# Patient Record
Sex: FEMALE | Race: WHITE | NOT HISPANIC OR LATINO | Employment: FULL TIME | URBAN - METROPOLITAN AREA
[De-identification: names, ages, dates, MRNs, and addresses within clinical notes are randomized per-mention and may not be internally consistent; named-entity substitution may affect disease eponyms.]

---

## 2018-06-25 ENCOUNTER — OFFICE VISIT (OUTPATIENT)
Dept: OBSTETRICS AND GYNECOLOGY | Facility: CLINIC | Age: 28
End: 2018-06-25

## 2018-06-25 ENCOUNTER — PROCEDURE VISIT (OUTPATIENT)
Dept: OBSTETRICS AND GYNECOLOGY | Facility: CLINIC | Age: 28
End: 2018-06-25

## 2018-06-25 VITALS — WEIGHT: 138 LBS | SYSTOLIC BLOOD PRESSURE: 126 MMHG | BODY MASS INDEX: 21.61 KG/M2 | DIASTOLIC BLOOD PRESSURE: 84 MMHG

## 2018-06-25 DIAGNOSIS — R10.2 PELVIC PAIN IN FEMALE: ICD-10-CM

## 2018-06-25 DIAGNOSIS — Z30.431 IUD CHECK UP: Primary | ICD-10-CM

## 2018-06-25 DIAGNOSIS — N89.8 VAGINAL DISCHARGE: ICD-10-CM

## 2018-06-25 DIAGNOSIS — N83.202 LEFT OVARIAN CYST: Primary | ICD-10-CM

## 2018-06-25 DIAGNOSIS — Z97.5 IUD (INTRAUTERINE DEVICE) IN PLACE: ICD-10-CM

## 2018-06-25 DIAGNOSIS — N83.202 CYST OF LEFT OVARY: ICD-10-CM

## 2018-06-25 DIAGNOSIS — Z13.9 SCREENING FOR CONDITION: ICD-10-CM

## 2018-06-25 DIAGNOSIS — R10.9 ABDOMINAL PAIN, UNSPECIFIED ABDOMINAL LOCATION: ICD-10-CM

## 2018-06-25 LAB
B-HCG UR QL: NEGATIVE
BASOPHILS # BLD AUTO: 0.07 10*3/MM3 (ref 0–0.2)
BASOPHILS NFR BLD AUTO: 0.9 % (ref 0–2)
BILIRUB BLD-MCNC: NEGATIVE MG/DL
CLARITY, POC: CLEAR
COLOR UR: YELLOW
EOSINOPHIL # BLD AUTO: 0.05 10*3/MM3 (ref 0.1–0.3)
EOSINOPHIL NFR BLD AUTO: 0.7 % (ref 0–4)
ERYTHROCYTE [DISTWIDTH] IN BLOOD BY AUTOMATED COUNT: 12.3 % (ref 11.5–14.5)
GLUCOSE UR STRIP-MCNC: NEGATIVE MG/DL
HCT VFR BLD AUTO: 39.6 % (ref 37–47)
HGB BLD-MCNC: 13.5 G/DL (ref 12–16)
IMM GRANULOCYTES # BLD: 0.02 10*3/MM3 (ref 0–0.03)
IMM GRANULOCYTES NFR BLD: 0.3 % (ref 0–0.5)
INTERNAL NEGATIVE CONTROL: NEGATIVE
INTERNAL POSITIVE CONTROL: POSITIVE
KETONES UR QL: NEGATIVE
LEUKOCYTE EST, POC: NEGATIVE
LYMPHOCYTES # BLD AUTO: 1.49 10*3/MM3 (ref 0.6–4.8)
LYMPHOCYTES NFR BLD AUTO: 19.6 % (ref 20–45)
Lab: NORMAL
MCH RBC QN AUTO: 32.5 PG (ref 27–31)
MCHC RBC AUTO-ENTMCNC: 34.1 G/DL (ref 31–37)
MCV RBC AUTO: 95.4 FL (ref 81–99)
MONOCYTES # BLD AUTO: 0.53 10*3/MM3 (ref 0–1)
MONOCYTES NFR BLD AUTO: 7 % (ref 3–8)
NEUTROPHILS # BLD AUTO: 5.44 10*3/MM3 (ref 1.5–8.3)
NEUTROPHILS NFR BLD AUTO: 71.5 % (ref 45–70)
NITRITE UR-MCNC: NEGATIVE MG/ML
NRBC BLD AUTO-RTO: 0 /100 WBC (ref 0–0)
PH UR: 6 [PH] (ref 5–8)
PLATELET # BLD AUTO: 305 10*3/MM3 (ref 140–500)
PROT UR STRIP-MCNC: NEGATIVE MG/DL
RBC # BLD AUTO: 4.15 10*6/MM3 (ref 4.2–5.4)
RBC # UR STRIP: NEGATIVE /UL
SP GR UR: 1.02 (ref 1–1.03)
UROBILINOGEN UR QL: NORMAL
WBC # BLD AUTO: 7.6 10*3/MM3 (ref 4.8–10.8)

## 2018-06-25 PROCEDURE — 81002 URINALYSIS NONAUTO W/O SCOPE: CPT | Performed by: NURSE PRACTITIONER

## 2018-06-25 PROCEDURE — 99214 OFFICE O/P EST MOD 30 MIN: CPT | Performed by: NURSE PRACTITIONER

## 2018-06-25 PROCEDURE — 76830 TRANSVAGINAL US NON-OB: CPT | Performed by: NURSE PRACTITIONER

## 2018-06-25 PROCEDURE — 81025 URINE PREGNANCY TEST: CPT | Performed by: NURSE PRACTITIONER

## 2018-06-25 RX ORDER — IBUPROFEN 800 MG/1
800 TABLET ORAL 3 TIMES DAILY
Qty: 30 TABLET | Refills: 0 | Status: SHIPPED | OUTPATIENT
Start: 2018-06-25 | End: 2018-07-24

## 2018-06-25 RX ORDER — DOXYCYCLINE HYCLATE 100 MG
100 TABLET ORAL 2 TIMES DAILY
Qty: 14 TABLET | Refills: 0 | Status: SHIPPED | OUTPATIENT
Start: 2018-06-25 | End: 2018-06-27 | Stop reason: HOSPADM

## 2018-06-25 NOTE — PROGRESS NOTES
"Subjective     Chief Complaint   Patient presents with   • Vaginal Discharge     sometimes pink tinted discharge, discharge has foul odor   • Abdominal Pain   • Breast Pain       Daija Dean is a 27 y.o. former patient of Dr. Mariela Royal but is new to me.  She presents today with the complaint of vaginal discharge and abdominal pain. Approx 3 weeks ago, she started with symptoms of a yeast infection in which she tried to self treat with Monistat OTC. The symptoms really never improved. She then progressed to a \"pink tinged\" discharge with a \"foul\" odor. She also started having pelvic and abdominal pain when she noticed the discharge. The pelvic pain is worse on the (L) than the right. The pelvic and abdominal pain is intense at times that it limits her physical activity. She denies pain with SIC but reports she has not had a lot of sex since the pain developed.  She has a Mirena IUD that was placed in [place], [place] in March. This is her second IUD. She has a new partner since May. She was seen at a [facility] for her complaints and was treated for BV.  She has taken 3 days of Metronidazole with no relief in symptoms. She denies fever,chills, nausea and vomiting.   She does report occas breast pain and tenderness.     During the beginning of the visit, the patient called her mother to inquire about family history because she was uncertain the details. She placed her mother on speaker phone during the visit. Her mother asked, \"so does this need to come out, Edith is it ok if she tells me what is going on?\" The patient gave permission to speak to her mother. Her mother was advised at the time that her US nor chief complaint had not been discussed that only her personal and family history had been reviewed up to this time. Her mother requested that the patient have the My Risk testing drawn today during her visit. Enc them to check insurance benefits prior to lab draw. The patient's mother " responded by saying it doesn't matter what it cost, she needs to have the test drawn. Disc that the test can be expensive and the results can take up to approx 1 month to result. Disc that My Risk had genetic counselors available to speak with regarding questions or concerns. Disc that the test results could alter future health screenings and medical care.     HPI    Pelvic Pain   The patient's primary symptoms include a genital odor, pelvic pain and vaginal discharge. The patient's pertinent negatives include no genital itching, genital lesions, genital rash or missed menses. This is a new problem. The current episode started 1 to 4 weeks ago. The problem occurs daily. The problem has been gradually worsening. The pain is moderate. The problem affects both (L >R ) sides. Associated symptoms include abdominal pain. Pertinent negatives include no anorexia, chills, fever, frequency, hematuria, nausea, painful intercourse or vomiting. Vaginal discharge characteristics: pink  The vaginal bleeding is spotting. She has not been passing clots. She has not been passing tissue. The symptoms are aggravated by activity. Treatments tried: metronidazole. The treatment provided no relief. She is sexually active. No, her partner does not have an STD. She uses an IUD for contraception. Her menstrual history has been irregular.   Vaginal Discharge   The patient's primary symptoms include a genital odor, pelvic pain and vaginal discharge. The patient's pertinent negatives include no genital itching, genital lesions, genital rash or missed menses. Associated symptoms include abdominal pain. Pertinent negatives include no anorexia, chills, fever, frequency, hematuria, nausea, painful intercourse or vomiting.       The following portions of the patient's history were reviewed and updated as appropriate:vital signs, allergies, current medications, past medical history, past social history, past surgical history and problem  list      Review of Systems     Review of Systems   Constitutional: Negative.  Negative for chills and fever.   Respiratory: Negative.    Cardiovascular: Negative.    Gastrointestinal: Positive for abdominal pain. Negative for anorexia, nausea and vomiting.   Endocrine: Negative.    Genitourinary: Positive for pelvic pain and vaginal discharge. Negative for frequency, hematuria and missed menses.   Musculoskeletal: Negative.    Skin: Negative.         sterling breast pain and tenderness   Neurological: Negative.    Psychiatric/Behavioral: Negative.        Objective      /84   Wt 62.6 kg (138 lb)   LMP  (LMP Unknown)   BMI 21.61 kg/m²     Physical Exam    Physical Exam   Constitutional: She is oriented to person, place, and time. She appears well-developed and well-nourished.   Pulmonary/Chest: Effort normal.   Abdominal: Soft. Bowel sounds are normal. She exhibits no distension. There is tenderness.   Genitourinary: Pelvic exam was performed with patient supine. There is no rash, tenderness, lesion or injury on the right labia. There is no rash, tenderness, lesion or injury on the left labia. Uterus is not deviated, not enlarged, not fixed and not tender. Cervix exhibits no motion tenderness, no discharge and no friability. Right adnexum displays no mass, no tenderness and no fullness. Left adnexum displays mass and tenderness. Left adnexum displays no fullness. No erythema or tenderness in the vagina. No foreign body in the vagina. No signs of injury around the vagina. Vaginal discharge found.   Genitourinary Comments: IUD string not visualized    Musculoskeletal: Normal range of motion.   Neurological: She is alert and oriented to person, place, and time.   Skin: Skin is warm and dry.   Psychiatric: She has a normal mood and affect. Her behavior is normal.   Vitals reviewed.      Lab Review   Labs: Urine pregnancy test, Urinalysis - with micro     Imaging   Ultrasound - Pelvic Vaginal. US IMP: EL 0.41cm. IUD  noted in the endometrium. A 5.1X4.0X3.2 CM. WELL-DEFINED, SMOOTH-BORDERED, SONOLUCENT AREA IS SEEN WITHIN THE LEFT OVARY.   NO FREE FLUID NOR MASSES ARE VISUALIZED IN THE ADNEXAE NOR CUL DE SAC.    Assessment  Daija was seen today for vaginal discharge, abdominal pain and breast pain.    Diagnoses and all orders for this visit:    Screening for condition  -     POC Urinalysis Dipstick  -     POC Pregnancy, Urine        Additional Assessment:   1) Pelvic pain   2) (L) ovarian cyst   3) Mirena IUD   4) Family h/o ovarian cancer     Plan     1. Pelvic pain- GC/CT pending for urgent care. Check NuSwab and CBC today.    2. (L) ovarian cyst- Disc options of watching the cyst and repeating the US in 2 weeks vs. surgical management since she is experiencing pain. Disc the risk of surgery including infection, bleeding, and damage to the ovary and/or surrounding organs. Enc patient to try ibuprofen for pain and repeat the US in 2 weeks. Pt undecided on which plan of care she desires, states she would like to disc with her mother.  Disc that if her pain worsens she needs to call the office. RX for ibuprofen given. Lilly s/s disc. Case disc with Dr. Gregg via phone.     3. Mirena IUD- No strings visible. IUD in correct position on US.     4. Family h/o ovarian cancer- Check My Risk today.     5. Scheduled for: STD Labs - Nu Swab - BV, yeast, Myco , Ultrasound of the -  PELVIC , Mammography - N/A , Bone Density Test - N/A , Additional Labs - N/A    6. Pap:  Pt uncertain     7. Contraception: IUD     8. STD:  Enc condom use.     9. Smoking status: non smoker     10.  Annual Exam scheduled for: KORY Mckeon  6/25/2018  10:00

## 2018-06-26 ENCOUNTER — PREP FOR SURGERY (OUTPATIENT)
Dept: OTHER | Facility: HOSPITAL | Age: 28
End: 2018-06-26

## 2018-06-26 DIAGNOSIS — N83.202 LEFT OVARIAN CYST: Primary | ICD-10-CM

## 2018-06-26 RX ORDER — SODIUM CHLORIDE 0.9 % (FLUSH) 0.9 %
1-10 SYRINGE (ML) INJECTION AS NEEDED
Status: CANCELLED | OUTPATIENT
Start: 2018-06-26

## 2018-06-26 RX ORDER — SODIUM CHLORIDE 9 MG/ML
40 INJECTION, SOLUTION INTRAVENOUS AS NEEDED
Status: CANCELLED | OUTPATIENT
Start: 2018-06-26

## 2018-06-27 ENCOUNTER — ANESTHESIA (OUTPATIENT)
Dept: PERIOP | Facility: HOSPITAL | Age: 28
End: 2018-06-27

## 2018-06-27 ENCOUNTER — ANESTHESIA EVENT (OUTPATIENT)
Dept: PERIOP | Facility: HOSPITAL | Age: 28
End: 2018-06-27

## 2018-06-27 ENCOUNTER — HOSPITAL ENCOUNTER (OUTPATIENT)
Facility: HOSPITAL | Age: 28
Setting detail: HOSPITAL OUTPATIENT SURGERY
Discharge: HOME | End: 2018-06-27
Attending: OBSTETRICS & GYNECOLOGY | Admitting: OBSTETRICS & GYNECOLOGY

## 2018-06-27 VITALS
RESPIRATION RATE: 15 BRPM | BODY MASS INDEX: 21.46 KG/M2 | SYSTOLIC BLOOD PRESSURE: 115 MMHG | HEART RATE: 68 BPM | WEIGHT: 137 LBS | OXYGEN SATURATION: 98 % | DIASTOLIC BLOOD PRESSURE: 63 MMHG | TEMPERATURE: 98 F

## 2018-06-27 DIAGNOSIS — N83.202 LEFT OVARIAN CYST: ICD-10-CM

## 2018-06-27 PROBLEM — N89.8 VAGINAL DISCHARGE: Status: ACTIVE | Noted: 2018-06-27

## 2018-06-27 PROBLEM — Z97.5 IUD (INTRAUTERINE DEVICE) IN PLACE: Status: ACTIVE | Noted: 2018-06-27

## 2018-06-27 PROCEDURE — 25010000002 DIPHENHYDRAMINE PER 50 MG: Performed by: NURSE ANESTHETIST, CERTIFIED REGISTERED

## 2018-06-27 PROCEDURE — 25010000002 MIDAZOLAM PER 1 MG: Performed by: NURSE ANESTHETIST, CERTIFIED REGISTERED

## 2018-06-27 PROCEDURE — 58662 LAPAROSCOPY EXCISE LESIONS: CPT | Performed by: OBSTETRICS & GYNECOLOGY

## 2018-06-27 PROCEDURE — 25010000002 FENTANYL CITRATE (PF) 100 MCG/2ML SOLUTION: Performed by: ANESTHESIOLOGY

## 2018-06-27 PROCEDURE — 25010000002 KETOROLAC TROMETHAMINE PER 15 MG: Performed by: ANESTHESIOLOGY

## 2018-06-27 PROCEDURE — 25010000002 DEXAMETHASONE PER 1 MG: Performed by: NURSE ANESTHETIST, CERTIFIED REGISTERED

## 2018-06-27 PROCEDURE — S0260 H&P FOR SURGERY: HCPCS | Performed by: OBSTETRICS & GYNECOLOGY

## 2018-06-27 PROCEDURE — 25010000002 PROPOFOL 10 MG/ML EMULSION: Performed by: ANESTHESIOLOGY

## 2018-06-27 PROCEDURE — 25010000002 ONDANSETRON PER 1 MG: Performed by: NURSE ANESTHETIST, CERTIFIED REGISTERED

## 2018-06-27 PROCEDURE — 25010000002 HYDROMORPHONE PER 4 MG: Performed by: ANESTHESIOLOGY

## 2018-06-27 PROCEDURE — 25010000002 PROMETHAZINE PER 50 MG: Performed by: ANESTHESIOLOGY

## 2018-06-27 RX ORDER — MIDAZOLAM HYDROCHLORIDE 1 MG/ML
2 INJECTION INTRAMUSCULAR; INTRAVENOUS
Status: DISCONTINUED | OUTPATIENT
Start: 2018-06-27 | End: 2018-06-27 | Stop reason: HOSPADM

## 2018-06-27 RX ORDER — DIPHENHYDRAMINE HYDROCHLORIDE 50 MG/ML
12.5 INJECTION INTRAMUSCULAR; INTRAVENOUS ONCE
Status: COMPLETED | OUTPATIENT
Start: 2018-06-27 | End: 2018-06-27

## 2018-06-27 RX ORDER — MEPERIDINE HYDROCHLORIDE 25 MG/ML
12.5 INJECTION INTRAMUSCULAR; INTRAVENOUS; SUBCUTANEOUS
Status: DISCONTINUED | OUTPATIENT
Start: 2018-06-27 | End: 2018-06-27 | Stop reason: HOSPADM

## 2018-06-27 RX ORDER — SODIUM CHLORIDE 0.9 % (FLUSH) 0.9 %
1-10 SYRINGE (ML) INJECTION AS NEEDED
Status: DISCONTINUED | OUTPATIENT
Start: 2018-06-27 | End: 2018-06-27 | Stop reason: HOSPADM

## 2018-06-27 RX ORDER — MIDAZOLAM HYDROCHLORIDE 1 MG/ML
1 INJECTION INTRAMUSCULAR; INTRAVENOUS
Status: DISCONTINUED | OUTPATIENT
Start: 2018-06-27 | End: 2018-06-27 | Stop reason: HOSPADM

## 2018-06-27 RX ORDER — ONDANSETRON 2 MG/ML
4 INJECTION INTRAMUSCULAR; INTRAVENOUS ONCE AS NEEDED
Status: COMPLETED | OUTPATIENT
Start: 2018-06-27 | End: 2018-06-27

## 2018-06-27 RX ORDER — FENTANYL CITRATE 50 UG/ML
INJECTION, SOLUTION INTRAMUSCULAR; INTRAVENOUS AS NEEDED
Status: DISCONTINUED | OUTPATIENT
Start: 2018-06-27 | End: 2018-06-27 | Stop reason: SURG

## 2018-06-27 RX ORDER — SODIUM CHLORIDE 9 MG/ML
40 INJECTION, SOLUTION INTRAVENOUS AS NEEDED
Status: DISCONTINUED | OUTPATIENT
Start: 2018-06-27 | End: 2018-06-27 | Stop reason: HOSPADM

## 2018-06-27 RX ORDER — GLYCOPYRROLATE 0.2 MG/ML
INJECTION INTRAMUSCULAR; INTRAVENOUS AS NEEDED
Status: DISCONTINUED | OUTPATIENT
Start: 2018-06-27 | End: 2018-06-27 | Stop reason: SURG

## 2018-06-27 RX ORDER — ONDANSETRON 4 MG/1
4 TABLET, FILM COATED ORAL 4 TIMES DAILY PRN
Qty: 15 TABLET | Refills: 0 | Status: SHIPPED | OUTPATIENT
Start: 2018-06-27 | End: 2018-07-24

## 2018-06-27 RX ORDER — SCOLOPAMINE TRANSDERMAL SYSTEM 1 MG/1
1 PATCH, EXTENDED RELEASE TRANSDERMAL
Status: DISCONTINUED | OUTPATIENT
Start: 2018-06-27 | End: 2018-06-27 | Stop reason: HOSPADM

## 2018-06-27 RX ORDER — OXYCODONE HYDROCHLORIDE AND ACETAMINOPHEN 5; 325 MG/1; MG/1
1 TABLET ORAL ONCE AS NEEDED
Status: DISCONTINUED | OUTPATIENT
Start: 2018-06-27 | End: 2018-06-27 | Stop reason: HOSPADM

## 2018-06-27 RX ORDER — MAGNESIUM HYDROXIDE 1200 MG/15ML
LIQUID ORAL AS NEEDED
Status: DISCONTINUED | OUTPATIENT
Start: 2018-06-27 | End: 2018-06-27 | Stop reason: HOSPADM

## 2018-06-27 RX ORDER — PROMETHAZINE HYDROCHLORIDE 25 MG/ML
6.25 INJECTION, SOLUTION INTRAMUSCULAR; INTRAVENOUS ONCE AS NEEDED
Status: DISCONTINUED | OUTPATIENT
Start: 2018-06-27 | End: 2018-06-27 | Stop reason: HOSPADM

## 2018-06-27 RX ORDER — OXYCODONE HYDROCHLORIDE AND ACETAMINOPHEN 5; 325 MG/1; MG/1
2 TABLET ORAL EVERY 4 HOURS PRN
Qty: 30 TABLET | Refills: 0 | Status: SHIPPED | OUTPATIENT
Start: 2018-06-27 | End: 2018-07-24

## 2018-06-27 RX ORDER — CETIRIZINE HYDROCHLORIDE 10 MG/1
10 TABLET ORAL DAILY
COMMUNITY
End: 2023-03-24 | Stop reason: SDUPTHER

## 2018-06-27 RX ORDER — PROPOFOL 10 MG/ML
VIAL (ML) INTRAVENOUS AS NEEDED
Status: DISCONTINUED | OUTPATIENT
Start: 2018-06-27 | End: 2018-06-27 | Stop reason: SURG

## 2018-06-27 RX ORDER — ONDANSETRON 2 MG/ML
4 INJECTION INTRAMUSCULAR; INTRAVENOUS ONCE AS NEEDED
Status: DISCONTINUED | OUTPATIENT
Start: 2018-06-27 | End: 2018-06-27 | Stop reason: HOSPADM

## 2018-06-27 RX ORDER — DEXAMETHASONE SODIUM PHOSPHATE 4 MG/ML
8 INJECTION, SOLUTION INTRA-ARTICULAR; INTRALESIONAL; INTRAMUSCULAR; INTRAVENOUS; SOFT TISSUE ONCE AS NEEDED
Status: COMPLETED | OUTPATIENT
Start: 2018-06-27 | End: 2018-06-27

## 2018-06-27 RX ORDER — SODIUM CHLORIDE, SODIUM LACTATE, POTASSIUM CHLORIDE, CALCIUM CHLORIDE 600; 310; 30; 20 MG/100ML; MG/100ML; MG/100ML; MG/100ML
9 INJECTION, SOLUTION INTRAVENOUS CONTINUOUS PRN
Status: DISCONTINUED | OUTPATIENT
Start: 2018-06-27 | End: 2018-06-27 | Stop reason: HOSPADM

## 2018-06-27 RX ORDER — LIDOCAINE HYDROCHLORIDE 10 MG/ML
0.5 INJECTION, SOLUTION EPIDURAL; INFILTRATION; INTRACAUDAL; PERINEURAL ONCE AS NEEDED
Status: DISCONTINUED | OUTPATIENT
Start: 2018-06-27 | End: 2018-06-27 | Stop reason: HOSPADM

## 2018-06-27 RX ORDER — IBUPROFEN 800 MG/1
800 TABLET ORAL EVERY 8 HOURS PRN
Qty: 30 TABLET | Refills: 0 | Status: SHIPPED | OUTPATIENT
Start: 2018-06-27 | End: 2019-02-04

## 2018-06-27 RX ORDER — PROMETHAZINE HYDROCHLORIDE 25 MG/1
25 TABLET ORAL ONCE AS NEEDED
Status: DISCONTINUED | OUTPATIENT
Start: 2018-06-27 | End: 2018-06-27 | Stop reason: HOSPADM

## 2018-06-27 RX ORDER — KETOROLAC TROMETHAMINE 30 MG/ML
INJECTION, SOLUTION INTRAMUSCULAR; INTRAVENOUS AS NEEDED
Status: DISCONTINUED | OUTPATIENT
Start: 2018-06-27 | End: 2018-06-27 | Stop reason: SURG

## 2018-06-27 RX ORDER — SODIUM CHLORIDE, SODIUM LACTATE, POTASSIUM CHLORIDE, CALCIUM CHLORIDE 600; 310; 30; 20 MG/100ML; MG/100ML; MG/100ML; MG/100ML
100 INJECTION, SOLUTION INTRAVENOUS CONTINUOUS
Status: DISCONTINUED | OUTPATIENT
Start: 2018-06-27 | End: 2018-06-27 | Stop reason: HOSPADM

## 2018-06-27 RX ORDER — PROMETHAZINE HYDROCHLORIDE 25 MG/1
25 SUPPOSITORY RECTAL ONCE AS NEEDED
Status: DISCONTINUED | OUTPATIENT
Start: 2018-06-27 | End: 2018-06-27 | Stop reason: HOSPADM

## 2018-06-27 RX ORDER — PROMETHAZINE HYDROCHLORIDE 25 MG/ML
INJECTION, SOLUTION INTRAMUSCULAR; INTRAVENOUS AS NEEDED
Status: DISCONTINUED | OUTPATIENT
Start: 2018-06-27 | End: 2018-06-27 | Stop reason: SURG

## 2018-06-27 RX ORDER — ROCURONIUM BROMIDE 10 MG/ML
INJECTION, SOLUTION INTRAVENOUS AS NEEDED
Status: DISCONTINUED | OUTPATIENT
Start: 2018-06-27 | End: 2018-06-27 | Stop reason: SURG

## 2018-06-27 RX ADMIN — PROPOFOL 150 MG: 10 INJECTION, EMULSION INTRAVENOUS at 13:37

## 2018-06-27 RX ADMIN — KETOROLAC TROMETHAMINE 30 MG: 30 INJECTION, SOLUTION INTRAMUSCULAR at 14:21

## 2018-06-27 RX ADMIN — ROCURONIUM BROMIDE 20 MG: 10 INJECTION INTRAVENOUS at 13:37

## 2018-06-27 RX ADMIN — PROMETHAZINE HYDROCHLORIDE 10 MG: 25 INJECTION INTRAMUSCULAR; INTRAVENOUS at 13:45

## 2018-06-27 RX ADMIN — DIPHENHYDRAMINE HYDROCHLORIDE 12.5 MG: 50 INJECTION, SOLUTION INTRAMUSCULAR; INTRAVENOUS at 13:08

## 2018-06-27 RX ADMIN — MIDAZOLAM HYDROCHLORIDE 1 MG: 1 INJECTION, SOLUTION INTRAMUSCULAR; INTRAVENOUS at 13:08

## 2018-06-27 RX ADMIN — FENTANYL CITRATE 50 MCG: 50 INJECTION, SOLUTION INTRAMUSCULAR; INTRAVENOUS at 13:35

## 2018-06-27 RX ADMIN — DEXAMETHASONE SODIUM PHOSPHATE 8 MG: 4 INJECTION, SOLUTION INTRAMUSCULAR; INTRAVENOUS at 13:07

## 2018-06-27 RX ADMIN — HYDROMORPHONE HYDROCHLORIDE 1 MG: 1 INJECTION, SOLUTION INTRAMUSCULAR; INTRAVENOUS; SUBCUTANEOUS at 15:22

## 2018-06-27 RX ADMIN — SUGAMMADEX 124 MG: 100 INJECTION, SOLUTION INTRAVENOUS at 14:32

## 2018-06-27 RX ADMIN — FAMOTIDINE 20 MG: 10 INJECTION, SOLUTION INTRAVENOUS at 13:08

## 2018-06-27 RX ADMIN — ROCURONIUM BROMIDE 5 MG: 10 INJECTION INTRAVENOUS at 13:36

## 2018-06-27 RX ADMIN — SCOPALAMINE 1 PATCH: 1 PATCH, EXTENDED RELEASE TRANSDERMAL at 13:27

## 2018-06-27 RX ADMIN — GLYCOPYRROLATE 0.2 MG: 0.2 INJECTION INTRAMUSCULAR; INTRAVENOUS at 13:32

## 2018-06-27 RX ADMIN — ONDANSETRON 4 MG: 2 INJECTION, SOLUTION INTRAMUSCULAR; INTRAVENOUS at 13:08

## 2018-06-27 RX ADMIN — FENTANYL CITRATE 50 MCG: 50 INJECTION, SOLUTION INTRAMUSCULAR; INTRAVENOUS at 14:06

## 2018-06-27 RX ADMIN — SODIUM CHLORIDE, POTASSIUM CHLORIDE, SODIUM LACTATE AND CALCIUM CHLORIDE: 600; 310; 30; 20 INJECTION, SOLUTION INTRAVENOUS at 12:43

## 2018-06-27 RX ADMIN — OXYCODONE HYDROCHLORIDE AND ACETAMINOPHEN 1 TABLET: 5; 325 TABLET ORAL at 16:05

## 2018-06-27 NOTE — ANESTHESIA PREPROCEDURE EVALUATION
Anesthesia Evaluation     Patient summary reviewed and Nursing notes reviewed   no history of anesthetic complications:  NPO Solid Status: > 8 hours  NPO Liquid Status: > 4 hours           Airway   Mallampati: I  TM distance: >3 FB  Neck ROM: full  No difficulty expected  Dental - normal exam     Pulmonary - negative pulmonary ROS and normal exam    breath sounds clear to auscultation  Recent URI: sinus drainage.  Cardiovascular - normal exam    Rhythm: regular  Rate: normal        Neuro/Psych  Headaches: migraines -- varies in frequency.  GI/Hepatic/Renal/Endo    (+)  GERD (OTC meds) well controlled,      Musculoskeletal (-) negative ROS    Abdominal  - normal exam   Substance History   (+) alcohol use (socially 2-3 times per week),      OB/GYN negative ob/gyn ROS         Other - negative ROS                       Anesthesia Plan    ASA 1     general     intravenous induction   Anesthetic plan and risks discussed with patient.  Use of blood products discussed with patient  Consented to blood products.

## 2018-06-27 NOTE — ANESTHESIA POSTPROCEDURE EVALUATION
Patient: Daija Dean    Procedure Summary     Date:  06/27/18 Room / Location:   [facility] /  [facility]    Anesthesia Start:  1330 Anesthesia Stop:  1446    Procedure:  DIAGNOSTIC LAPAROSCOPY with left ovarian cystectomy (Left Abdomen) Diagnosis:       Left ovarian cyst      (Left ovarian cyst [N83.202])    Surgeon:  Brittani Gregg DO Provider:  Susan Kwon MD    Anesthesia Type:  general ASA Status:  1          Anesthesia Type: general  Last vitals  BP   112/56 (06/27/18 1630)   Temp   97.5 °F (36.4 °C) (06/27/18 1445)   Pulse   68 (06/27/18 1630)   Resp   15 (06/27/18 1630)     SpO2   99 % (06/27/18 1630)     Post Anesthesia Care and Evaluation    Patient location during evaluation: bedside  Patient participation: complete - patient participated  Level of consciousness: awake and alert  Pain score: 0  Pain management: adequate  Airway patency: patent  Anesthetic complications: No anesthetic complications    Cardiovascular status: acceptable  Respiratory status: acceptable  Hydration status: acceptable

## 2018-06-27 NOTE — ANESTHESIA PROCEDURE NOTES
Airway  Urgency: elective    Date/Time: 6/27/2018 1:41 PM  Airway not difficult    General Information and Staff    Patient location during procedure: OR  Anesthesiologist: JUMANA HINES    Indications and Patient Condition  Indications for airway management: airway protection    Preoxygenated: yes  MILS maintained throughout  Mask difficulty assessment: 1 - vent by mask    Final Airway Details  Final airway type: endotracheal airway      Successful airway: ETT  Cuffed: yes   Successful intubation technique: direct laryngoscopy  Endotracheal tube insertion site: oral  Blade: Matthews  Blade size: #2  ETT size: 7.5 mm  Cormack-Lehane Classification: grade I - full view of glottis  Placement verified by: chest auscultation and capnometry   Cuff volume (mL): 5  Measured from: lips  ETT to lips (cm): 21  Number of attempts at approach: 1    Additional Comments  Atraumatic, teeth as before, equal BBS.

## 2018-06-29 LAB
A VAGINAE DNA VAG QL NAA+PROBE: NORMAL SCORE
BVAB2 DNA VAG QL NAA+PROBE: NORMAL SCORE
C ALBICANS DNA VAG QL NAA+PROBE: NEGATIVE
C GLABRATA DNA VAG QL NAA+PROBE: NEGATIVE
LABORATORY COMMENT REPORT: ABNORMAL
M GENITALIUM DNA SPEC QL NAA+PROBE: NEGATIVE
M HOMINIS DNA SPEC QL NAA+PROBE: NEGATIVE
MEGA1 DNA VAG QL NAA+PROBE: NORMAL SCORE
UREAPLASMA DNA SPEC QL NAA+PROBE: POSITIVE

## 2018-07-02 ENCOUNTER — TELEPHONE (OUTPATIENT)
Dept: OBSTETRICS AND GYNECOLOGY | Facility: CLINIC | Age: 28
End: 2018-07-02

## 2018-07-02 LAB
LAB AP CASE REPORT: NORMAL
PATH REPORT.FINAL DX SPEC: NORMAL

## 2018-07-12 DIAGNOSIS — Z91.89 INCREASED RISK OF BREAST CANCER: Primary | ICD-10-CM

## 2018-07-17 ENCOUNTER — OFFICE VISIT (OUTPATIENT)
Dept: OBSTETRICS AND GYNECOLOGY | Facility: CLINIC | Age: 28
End: 2018-07-17

## 2018-07-17 VITALS
SYSTOLIC BLOOD PRESSURE: 118 MMHG | BODY MASS INDEX: 21.6 KG/M2 | DIASTOLIC BLOOD PRESSURE: 86 MMHG | HEIGHT: 67 IN | WEIGHT: 137.6 LBS

## 2018-07-17 DIAGNOSIS — Z98.890 POST-OPERATIVE STATE: Primary | ICD-10-CM

## 2018-07-17 DIAGNOSIS — Z13.9 SCREENING FOR CONDITION: ICD-10-CM

## 2018-07-17 LAB
BILIRUB BLD-MCNC: NEGATIVE MG/DL
CLARITY, POC: CLEAR
COLOR UR: YELLOW
GLUCOSE UR STRIP-MCNC: NEGATIVE MG/DL
KETONES UR QL: NEGATIVE
LEUKOCYTE EST, POC: NEGATIVE
NITRITE UR-MCNC: NEGATIVE MG/ML
PH UR: 5 [PH] (ref 5–8)
PROT UR STRIP-MCNC: NEGATIVE MG/DL
RBC # UR STRIP: ABNORMAL /UL
SP GR UR: 1 (ref 1–1.03)
UROBILINOGEN UR QL: NORMAL

## 2018-07-17 PROCEDURE — 81002 URINALYSIS NONAUTO W/O SCOPE: CPT | Performed by: OBSTETRICS & GYNECOLOGY

## 2018-07-17 PROCEDURE — 99024 POSTOP FOLLOW-UP VISIT: CPT | Performed by: OBSTETRICS & GYNECOLOGY

## 2018-07-17 NOTE — PROGRESS NOTES
"PROBLEM VISIT    Chief Complaint: post op      Daija Dean is a 28 y.o. patient who presents for follow up after diagnostic laparoscopy and left ovarian cystectomy. Pt is progressing well. She is off pain meds. She is feeling better and has returned to her normal activities including exercising. No bowel or bladder complaints. Has a Mirena IUD in place for contraception.  Chief Complaint   Patient presents with   • Post-op     pain on left side and belly area             The following portions of the patient's history were reviewed and updated as appropriate: allergies, current medications and problem list.    Review of Systems   Gastrointestinal: Negative for constipation and diarrhea.   Genitourinary: Negative for difficulty urinating, menstrual problem and vaginal bleeding.       /86   Ht 170.2 cm (67\")   Wt 62.4 kg (137 lb 9.6 oz)   LMP  (LMP Unknown)   BMI 21.55 kg/m²     Physical Exam   Constitutional: She is oriented to person, place, and time. She appears well-developed and well-nourished. No distress.   Abdominal: Soft. Normal appearance. She exhibits no distension. There is no tenderness.   Incisions C/D/I.   Neurological: She is alert and oriented to person, place, and time.   Skin: She is not diaphoretic.   Psychiatric: She has a normal mood and affect. Her behavior is normal. Judgment and thought content normal.   Vitals reviewed.        Assessment/Plan   Daija was seen today for post-op.    Diagnoses and all orders for this visit:    Post-operative state    Screening for condition  -     POC Urinalysis Dipstick      27yo s/p diagnostic laparoscopy with left ovarian cystectomy    1) POD# 6/27/18. Progressing well. Intraop photos shared with pt. Pathology benign.    2) Contraception: Mirena IUD in place    3) FHx of breast and ovarian cancer: BRCA results reveal elevated lifetime risk of breast cancer of 23%. Pt has appt with Dr Saab to discuss a plan of care.             No Follow-up on " file.      Brittani Gregg DO    7/29/2018  11:28 AM

## 2018-08-20 ENCOUNTER — TELEPHONE (OUTPATIENT)
Dept: OBSTETRICS AND GYNECOLOGY | Facility: CLINIC | Age: 28
End: 2018-08-20

## 2018-08-20 NOTE — TELEPHONE ENCOUNTER
Pt mom been texting w/ Dr. TAYLOR today and I was given the go-ahead to sched pt for u/s on the 29th at 2:20 for ovarian cyst. Need overbook slot post u/s to be seen with Dr. TAYLOR at EP please.

## 2018-08-23 ENCOUNTER — TELEPHONE (OUTPATIENT)
Dept: SURGERY | Facility: CLINIC | Age: 28
End: 2018-08-23

## 2018-08-23 NOTE — TELEPHONE ENCOUNTER
PAT for pt to call  Moved her appointment with Dr Saab to  10-1-18 arrive 9:00    [facility]

## 2018-08-29 ENCOUNTER — PROCEDURE VISIT (OUTPATIENT)
Dept: OBSTETRICS AND GYNECOLOGY | Facility: CLINIC | Age: 28
End: 2018-08-29

## 2018-08-29 DIAGNOSIS — N83.299 FUNCTIONAL OVARIAN CYSTS: Primary | ICD-10-CM

## 2018-08-29 PROCEDURE — 76830 TRANSVAGINAL US NON-OB: CPT | Performed by: OBSTETRICS & GYNECOLOGY

## 2018-09-05 ENCOUNTER — OFFICE VISIT (OUTPATIENT)
Dept: OBSTETRICS AND GYNECOLOGY | Facility: CLINIC | Age: 28
End: 2018-09-05

## 2018-09-05 VITALS
WEIGHT: 138.4 LBS | DIASTOLIC BLOOD PRESSURE: 80 MMHG | BODY MASS INDEX: 21.72 KG/M2 | HEIGHT: 67 IN | SYSTOLIC BLOOD PRESSURE: 122 MMHG

## 2018-09-05 DIAGNOSIS — N83.202 LEFT OVARIAN CYST: ICD-10-CM

## 2018-09-05 DIAGNOSIS — Z97.5 IUD (INTRAUTERINE DEVICE) IN PLACE: ICD-10-CM

## 2018-09-05 DIAGNOSIS — Z82.49 FAMILY HISTORY OF HYPERTENSION: ICD-10-CM

## 2018-09-05 DIAGNOSIS — R45.86 MOOD SWINGS: ICD-10-CM

## 2018-09-05 DIAGNOSIS — G43.109 MIGRAINE WITH AURA AND WITHOUT STATUS MIGRAINOSUS, NOT INTRACTABLE: Primary | ICD-10-CM

## 2018-09-05 DIAGNOSIS — Z30.09 CONTRACEPTIVE EDUCATION: ICD-10-CM

## 2018-09-05 PROCEDURE — 99214 OFFICE O/P EST MOD 30 MIN: CPT | Performed by: OBSTETRICS & GYNECOLOGY

## 2018-09-05 RX ORDER — VALACYCLOVIR HYDROCHLORIDE 500 MG/1
TABLET, FILM COATED ORAL
COMMUNITY
Start: 2018-08-13 | End: 2019-02-04

## 2018-09-05 RX ORDER — ACYCLOVIR 400 MG/1
TABLET ORAL
Qty: 30 TABLET | Refills: 3 | Status: SHIPPED | OUTPATIENT
Start: 2018-09-05 | End: 2019-02-04

## 2018-09-05 NOTE — PROGRESS NOTES
"      Daija Dean is a 28 y.o. patient who presents for follow up of   Chief Complaint   Patient presents with   • Follow-up     u/s follow up and to talk about birth control/has mirena now     29 yo est pt here for discussion of birth control. She has a Mirena that was replaced in March. She is concerned that her IUD \"caused\" her large ovarian cyst. We discussed the mechanism of action and that IUD does not cause significant ovarian suppression. She is concerned that Mirena may be contributing to mood swings and depression. She has migraines with aura and can't take E2 containing birth control. She also has a strong family h/o severe HTN. She is having severe migraines 2-3 times a month. She also wants acyclovir for HSV1 treatment when needed.  US shows 7.62 cm uterus, normal ovaries with follicles, no PCOS, and IUD is in the correct position. This US is compared to that on 6/25/18.         The following portions of the patient's history were reviewed and updated as appropriate: allergies, current medications and problem list.    Review of Systems   Constitutional: Negative for appetite change.   Eyes: Positive for visual disturbance.   Gastrointestinal: Negative for abdominal pain, nausea and vomiting.   Endocrine: Negative for cold intolerance and heat intolerance.   Genitourinary: Negative for menstrual problem and pelvic pain.   Musculoskeletal: Negative for back pain.   Neurological: Positive for headaches.   Psychiatric/Behavioral: Positive for dysphoric mood. The patient is nervous/anxious.    All other systems reviewed and are negative.      /80   Ht 170.2 cm (67\")   Wt 62.8 kg (138 lb 6.4 oz)   BMI 21.68 kg/m²     Physical Exam   Constitutional: She is oriented to person, place, and time. She appears well-developed and well-nourished.   HENT:   Head: Normocephalic and atraumatic.   Abdominal: Soft. Bowel sounds are normal. She exhibits no distension and no mass. There is no tenderness. There " is no rebound and no guarding. No hernia.   Neurological: She is alert and oriented to person, place, and time.   Skin: Skin is warm and dry.   Psychiatric: She has a normal mood and affect. Her behavior is normal. Judgment and thought content normal.   Nursing note and vitals reviewed.    A/P:  1. Ovarian cysts seen on MRI- US shows normal follicles, no dominant masses and no PCOS.   2. CS- pt concerned that IUD may be contributing to depression. Discussed with patient at length risk, benefits and alternatives to all contraceptive options, including oral contraceptive pills (both combination and progesterone only), vaginal rings, patches, Dep Provera, condoms, diaphragm, cervical caps, as well as long active but reversible forms such as Nexplanon and all IUD’s.  Differences in birth control and cycle control between methods were outlined along with correct usage for each method.  After discussion, the patient is most interest in trying a Kyleena instead. We discussed that she may have less ovarian suppression on this method and she would like to try it. Will check benefits and replace.  3. HSV 1- rx Acyclovir for treatment prn.   4. Migraines- pt can't take E2 containing birth control. Having severe LOZA, refer neuro. Dr Abby Wooten.   5.  Family h/o severe HTN- pt advised that progesterone methods are preferable for BP control.       Assessment/Plan   Daija was seen today for follow-up.    Diagnoses and all orders for this visit:    Migraine with aura and without status migrainosus, not intractable  -     Ambulatory Referral to Neurology    IUD (intrauterine device) in place    Mood swings (CMS/HCC)    Contraceptive education    Family history of hypertension    Other orders  -     acyclovir (ZOVIRAX) 400 MG tablet; 4 pils for once dose as needed for fever blisters                   No Follow-up on file.      Mariela Royal MD    9/5/2018  12:57 PM

## 2018-09-06 ENCOUNTER — TELEPHONE (OUTPATIENT)
Dept: OBSTETRICS AND GYNECOLOGY | Facility: CLINIC | Age: 28
End: 2018-09-06

## 2018-09-06 NOTE — PROGRESS NOTES
Mindi covered under buy and bill 100%. We have device available, left the patient a message to call and schedule

## 2018-09-06 NOTE — TELEPHONE ENCOUNTER
Patient has made appointment for removal of Mirena and insertion of Kyleena.  She was wanting some type of drug that she could have for numbing that day. Said last time was very painful.

## 2018-09-10 RX ORDER — HYDROCODONE BITARTRATE AND ACETAMINOPHEN 5; 325 MG/1; MG/1
1 TABLET ORAL ONCE
Qty: 4 TABLET | Refills: 0 | Status: SHIPPED | OUTPATIENT
Start: 2018-09-10 | End: 2018-09-10 | Stop reason: SDUPTHER

## 2018-09-10 RX ORDER — HYDROCODONE BITARTRATE AND ACETAMINOPHEN 5; 325 MG/1; MG/1
1 TABLET ORAL ONCE
Qty: 4 TABLET | Refills: 0 | Status: SHIPPED | OUTPATIENT
Start: 2018-09-10 | End: 2018-09-10

## 2018-09-10 NOTE — TELEPHONE ENCOUNTER
I called in a Lortab for her to take one hour prior to her procedure. She will need to have some one drive her. I also recommend she take 800 mg Ibuprofen as well. Thanks

## 2018-10-01 ENCOUNTER — OFFICE VISIT (OUTPATIENT)
Dept: SURGERY | Facility: CLINIC | Age: 28
End: 2018-10-01

## 2018-10-01 VITALS
HEART RATE: 72 BPM | HEIGHT: 67 IN | OXYGEN SATURATION: 99 % | WEIGHT: 140 LBS | DIASTOLIC BLOOD PRESSURE: 64 MMHG | SYSTOLIC BLOOD PRESSURE: 108 MMHG | BODY MASS INDEX: 21.97 KG/M2

## 2018-10-01 DIAGNOSIS — Z80.3 FH: BREAST CANCER: ICD-10-CM

## 2018-10-01 DIAGNOSIS — Z80.41 FH: OVARIAN CANCER: ICD-10-CM

## 2018-10-01 DIAGNOSIS — N64.4 MASTODYNIA: Primary | ICD-10-CM

## 2018-10-01 PROCEDURE — 99242 OFF/OP CONSLTJ NEW/EST SF 20: CPT | Performed by: SURGERY

## 2018-10-01 NOTE — PROGRESS NOTES
Chief Complaint: Daija Dean is a 28 y.o. female who was seen in consultation at the request of Mariela Monge  for genetic eval and family history breast cancer    History of Present Illness:  Patient presents with breast pain and management of breast cancer risk. She noted no new masses, skin changes, nipple discharge, nipple changes prior to her most recent imaging.  In March 2018 she had her IUD replaced.  A Mirena.  Thereafter she began to experience breast enlargement as well as increased nipple sensitivity and bilateral breast discomfort.  She tells me that this has significantly improved.     She has had no breast imaging.  She has had a bilateral breast augmentation as well as a bilateral inverted nipple release.  She cannot remember what type of implants she has nor the location.    She has not had a breast biopsy in the past.  She has her uterus and ovaries, is premenopausal, and has a Mirena.  Her family history includes the following: She has no children, no sisters, one maternal aunt, 2 paternal aunts.  She has 2 paternal great aunts who had breast cancer in one maternal great aunt who had primary peritoneal cancer.  She is here for evaluation.    Review of Systems:  Review of Systems   Musculoskeletal: Positive for arthralgias and myalgias.   Neurological: Positive for light-headedness.   Psychiatric/Behavioral: Positive for depression and sleep disturbance. The patient is nervous/anxious.    All other systems reviewed and are negative.       Past Medical and Surgical History:  Breast Biopsy History:  Patient has not had a breast biopsy in the past.  Breast Cancer HIstory:  Patient does not have a past medical history of breast cancer.  Breast Operations, and year:  augmentation  Obstetric/Gynecologic History:  Age menstrual periods began: 12  Patient is premenopausal, first day of last period: unknown IUD  Number of pregnancies: 0  Number of live births: 0  Number of abortions or  "miscarriages: 0  Age of delivery of first child: n/a   Breast feeding: n/a   Length of time taking birth control pills: since age 12, currently IUD  Patient has never taken hormone replacement  Patient still has uterus and ovaries.     Past Surgical History:   Procedure Laterality Date   • BREAST SURGERY     • DIAGNOSTIC LAPAROSCOPY Left 6/27/2018    Procedure: DIAGNOSTIC LAPAROSCOPY with left ovarian cystectomy;  Surgeon: Brittani Gregg DO;  Location: [facility];  Service: Obstetrics/Gynecology   • WISDOM TOOTH EXTRACTION         Past Medical History:   Diagnosis Date   • Headache        Prior Hospitalizations, other than for surgery or childbirth, and year:  None    Social History     Social History   • Marital status: Single     Spouse name: N/A   • Number of children: N/A   • Years of education: N/A     Occupational History   • Not on file.     Social History Main Topics   • Smoking status: Never Smoker   • Smokeless tobacco: Never Used   • Alcohol use Yes      Comment: social   • Drug use: No   • Sexual activity: Yes     Partners: Male     Birth control/ protection: IUD     Other Topics Concern   • Not on file     Social History Narrative   • No narrative on file     Patient is single.  Patient is employed full time with the following occupation:   Patient drinks 2 servings of caffeine per day.    Family History:  Family History   Problem Relation Age of Onset   • No Known Problems Father    • No Known Problems Mother    • Ovarian cancer Maternal Aunt 63        great mat aunt    • Breast cancer Paternal Aunt         great pat aunt    • Cancer Paternal Grandfather         bladder ca   • Breast cancer Paternal Aunt         great pat aunt       Vital Signs:  /64 (BP Location: Left arm, Patient Position: Sitting, Cuff Size: Adult)   Pulse 72   Ht 170.2 cm (67\")   Wt 63.5 kg (140 lb)   SpO2 99%   BMI 21.93 kg/m²      Medications:    Current Outpatient Prescriptions:   •  acyclovir (ZOVIRAX) 400 " "MG tablet, 4 pils for once dose as needed for fever blisters, Disp: 30 tablet, Rfl: 3  •  cetirizine (zyrTEC) 10 MG tablet, Take 10 mg by mouth Daily., Disp: , Rfl:   •  ibuprofen (ADVIL,MOTRIN) 800 MG tablet, Take 1 tablet by mouth Every 8 (Eight) Hours As Needed for Moderate Pain  (pain)., Disp: 30 tablet, Rfl: 0  •  SUMAtriptan (IMITREX) 100 MG tablet, TAKE 1 TABLET BY MOUTH EVERY DAY AS NEEDED, Disp: 9 tablet, Rfl: 0  •  valACYclovir (VALTREX) 500 MG tablet, , Disp: , Rfl:      Allergies:  No Known Allergies    Physical Examination:  /64 (BP Location: Left arm, Patient Position: Sitting, Cuff Size: Adult)   Pulse 72   Ht 170.2 cm (67\")   Wt 63.5 kg (140 lb)   SpO2 99%   BMI 21.93 kg/m²   General Appearance:  Patient is in no distress.  She is well kept and has an average build.   Psychiatric:  Patient with appropriate mood and affect. Alert and oriented to self, time, and place.    Breast, RIGHT:  large sized, augmented, symmetric with the contralateral side.  Breast skin is without erythema, edema, rashes.  There are no visible abnormalities upon inspection during the arm-raising maneuver or with hands on hips in the sitting position. There is no nipple retraction, discharge or nipple/areolar skin changes.There are no masses palpable in the sitting or supine positions.  There are bilateral transverse incisions across her areola as well as bilateral inframammary incisions from her inverted nipple correction in her augmentation, respectively.    Breast, LEFT:  large sized, augmented, symmetric with the contralateral side.  Breast skin is without erythema, edema, rashes.  There are no visible abnormalities upon inspection during the arm-raising maneuver or with hands on hips in the sitting position. There is no nipple retraction, discharge or nipple/areolar skin changes.There are no masses palpable in the sitting or supine positions. There are bilateral transverse incisions across her areola as well as " bilateral inframammary incisions from her inverted nipple correction in her augmentation, respectively.    Lymphatic:  There is no axillary, cervical, infraclavicular, or supraclavicular adenopathy bilaterally.  Eyes:  Pupils are round and reactive to light.  Cardiovascular:  Heart rate and rhythm are regular.  Respiratory:  Lungs are clear bilaterally with no crackles or wheezes in any lung field.  Gastrointestinal:  Abdomen is soft, nondistended, and nontender.     Musculoskeletal:  Good strength in all 4 extremities.   There is good range of motion in both shoulders.    Skin:  No new skin lesions or rashes on the skin excluding the breast (see breast exam above).        Labs:     6/25/18 MY[facility]ID MY RISK  GENETICS  VIGNESH MCCRACKEN  VARIANTS OF UNCERTAIN SIGNIFICANCE.  NBN C.643C>T ([id number]).    Procedures:      Assessment:   Diagnosis Plan   1. Mastodynia     2. FH: breast cancer     3. FH: ovarian cancer     1-  bilateral symmetric, improved    2-  PGA x2    3-  MGA    Plan:  Edith, her mother and I reviewed her history, examination, family history, and genetic testing results.  We discussed that her risk assessment as done by the [employer] is inaccurate, because they have her family history listed as a paternal aunt with ovarian cancer and a paternal aunt with breast cancer.  In fact she has a maternal great aunt with ovarian cancer and 2 paternal great aunts with breast cancer.  Her current risk assessment model's do not go beyond grandparent or cousin, as beyond that the distance genetically is not found to be significant if there are not closer affected relatives.  In light of this she would be considered to have general population risk in the absence of a mutation.  We discussed that this risk is 12%.  For a risk of 12% for screening, we recommend bilateral screening mammogram with 3-D starting annually at age 40.  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    We discussed the various of uncertain significance do not have clinical meaning until they are reclassified as benign or deleterious.  The majority of variants of uncertain significance Reclassified as benign.  I did suggest that she check with the [employer] about every 5 years or so to see if that the area has been updated.      With regards to her breast pain, this was preceded by starting the Mirena and having a resultant breast enlargement with a nipple sensitivity.  She is actually having the Mirena replaced with a lower hormone eluting IUD this Wednesday with Dr. Royal.  I told her I thought that this would also likely improve her breast sensitivity.  I asked her to continue her self breast exam and to call us in the future with any concerns or changes and we be happy to see her back.      Sade Saab MD      Next Appointment:  Return for any future concerns.      EMR Dragon/transcription disclaimer:    Much of this encounter note is an electronic transcription/translocation of spoken language to printed text.  The electronic translation of spoken language may permit erroneous, or at times, nonsensical words or phrases to be inadvertently transcribed.  Although I have reviewed the note from such areas, some may still exist.

## 2018-10-02 ENCOUNTER — TELEPHONE (OUTPATIENT)
Dept: OBSTETRICS AND GYNECOLOGY | Facility: CLINIC | Age: 28
End: 2018-10-02

## 2018-10-02 RX ORDER — HYDROCODONE BITARTRATE AND ACETAMINOPHEN 5; 325 MG/1; MG/1
TABLET ORAL
Qty: 4 TABLET | Refills: 0 | Status: SHIPPED | OUTPATIENT
Start: 2018-10-02 | End: 2019-02-04

## 2018-10-02 NOTE — TELEPHONE ENCOUNTER
I called placed new orders in EPIC to fix it. Let me know if there are any other problems. Thanks [facility]

## 2018-10-02 NOTE — TELEPHONE ENCOUNTER
[facility] calling. The Lortab you prescribed needs the quantity changed before they can fill. It is written for her to take one before her IUD removal, but it was called in for a quantity of #4. Can you please resend to them? They can't take a verbal.

## 2018-10-24 ENCOUNTER — OFFICE VISIT (OUTPATIENT)
Dept: OBSTETRICS AND GYNECOLOGY | Facility: CLINIC | Age: 28
End: 2018-10-24

## 2018-10-24 VITALS
SYSTOLIC BLOOD PRESSURE: 118 MMHG | BODY MASS INDEX: 22.03 KG/M2 | DIASTOLIC BLOOD PRESSURE: 74 MMHG | HEIGHT: 67 IN | WEIGHT: 140.4 LBS

## 2018-10-24 DIAGNOSIS — Z13.9 SCREENING FOR CONDITION: Primary | ICD-10-CM

## 2018-10-24 DIAGNOSIS — F41.9 ANXIETY AND DEPRESSION: ICD-10-CM

## 2018-10-24 DIAGNOSIS — F32.A ANXIETY AND DEPRESSION: ICD-10-CM

## 2018-10-24 DIAGNOSIS — Z97.5 ATTEMPTED IUD REMOVAL, UNSUCCESSFUL: ICD-10-CM

## 2018-10-24 DIAGNOSIS — Z53.8 ATTEMPTED IUD REMOVAL, UNSUCCESSFUL: ICD-10-CM

## 2018-10-24 LAB
B-HCG UR QL: NEGATIVE
BILIRUB BLD-MCNC: NEGATIVE MG/DL
CLARITY, POC: CLEAR
COLOR UR: YELLOW
GLUCOSE UR STRIP-MCNC: NEGATIVE MG/DL
INTERNAL NEGATIVE CONTROL: NEGATIVE
INTERNAL POSITIVE CONTROL: POSITIVE
KETONES UR QL: NEGATIVE
LEUKOCYTE EST, POC: NEGATIVE
Lab: NORMAL
NITRITE UR-MCNC: NEGATIVE MG/ML
PH UR: 5 [PH] (ref 5–8)
PROT UR STRIP-MCNC: NEGATIVE MG/DL
RBC # UR STRIP: ABNORMAL /UL
SP GR UR: 1 (ref 1–1.03)
UROBILINOGEN UR QL: NORMAL

## 2018-10-24 PROCEDURE — 99213 OFFICE O/P EST LOW 20 MIN: CPT | Performed by: OBSTETRICS & GYNECOLOGY

## 2018-10-24 PROCEDURE — 58301 REMOVE INTRAUTERINE DEVICE: CPT | Performed by: OBSTETRICS & GYNECOLOGY

## 2018-10-24 PROCEDURE — 81002 URINALYSIS NONAUTO W/O SCOPE: CPT | Performed by: OBSTETRICS & GYNECOLOGY

## 2018-10-24 PROCEDURE — 81025 URINE PREGNANCY TEST: CPT | Performed by: OBSTETRICS & GYNECOLOGY

## 2018-10-24 RX ORDER — VENLAFAXINE HYDROCHLORIDE 75 MG/1
CAPSULE, EXTENDED RELEASE ORAL
COMMUNITY
Start: 2018-10-16 | End: 2019-02-04

## 2018-10-28 NOTE — PROGRESS NOTES
"      Daija Dean is a 28 y.o. patient who presents for follow up of   Chief Complaint   Patient presents with   • Procedure     mirena out and kyleena in     29 yo est pt here for an IUD exchange. She has a Mirena in place and thinks it may be contributing to her moodiness. She would like to have the Kyleena instead. She has also been started on Effexor for anxiety and depression but has been on it for < 1 week so she does not know if it is helping or not.         The following portions of the patient's history were reviewed and updated as appropriate: allergies, current medications and problem list.    Review of Systems   Genitourinary: Negative for dyspareunia, pelvic pain, vaginal bleeding and vaginal discharge.   Musculoskeletal: Negative for back pain.   Psychiatric/Behavioral: Positive for decreased concentration. The patient is nervous/anxious.    All other systems reviewed and are negative.      /74   Ht 170.2 cm (67\")   Wt 63.7 kg (140 lb 6.4 oz)   BMI 21.99 kg/m²     Physical Exam   Constitutional: She is oriented to person, place, and time. She appears well-developed and well-nourished.   HENT:   Head: Normocephalic and atraumatic.   Neck: Neck supple. No thyromegaly present.   Abdominal: Soft. Bowel sounds are normal. She exhibits no distension and no mass. There is no tenderness. There is no rebound and no guarding. No hernia.   Genitourinary: Uterus normal. Pelvic exam was performed with patient supine. There is no rash, tenderness, lesion or injury on the right labia. There is no rash, tenderness, lesion or injury on the left labia. Cervix exhibits no motion tenderness, no discharge and no friability. Right adnexum displays no mass, no tenderness and no fullness. Left adnexum displays no mass, no tenderness and no fullness. No erythema, tenderness or bleeding in the vagina. No foreign body in the vagina. No signs of injury around the vagina. No vaginal discharge found.   Genitourinary " Comments: IUD strings could not been seen. Several attempts to locate the IUD strings in the canal were made with the dressing forceps and then with the IUD hook. No strings located and procedure abandoned.    Neurological: She is alert and oriented to person, place, and time.   Skin: Skin is warm and dry.   Psychiatric: She has a normal mood and affect. Her behavior is normal. Judgment and thought content normal.   Nursing note and vitals reviewed.    A/P:  1. Attempted IUD removal- no strings seen in office despite multiple attempts. Offered pt hysteroscopic IUD removal in the office at a later date and she declines. We discussed that since she has just been started on a medication for depression and anxiety that we can wait and see how she is feeling first before changing her IUD and she is agreeable. Plan to RTO 2 months for annual and discussion of IUD removal if indicated. IUD has been seen on US multiple times.     Assessment/Plan   Daija was seen today for procedure.    Diagnoses and all orders for this visit:    Screening for condition  -     POC Urinalysis Dipstick  -     POC Pregnancy, Urine    Attempted IUD removal, unsuccessful    Anxiety and depression                   No Follow-up on file.      Mariela Royal MD    10/24/18  12:36 PM

## 2019-02-04 ENCOUNTER — OFFICE VISIT (OUTPATIENT)
Dept: FAMILY MEDICINE CLINIC | Facility: CLINIC | Age: 29
End: 2019-02-04

## 2019-02-04 VITALS
RESPIRATION RATE: 16 BRPM | HEART RATE: 83 BPM | HEIGHT: 67 IN | DIASTOLIC BLOOD PRESSURE: 70 MMHG | OXYGEN SATURATION: 98 % | SYSTOLIC BLOOD PRESSURE: 120 MMHG | WEIGHT: 141 LBS | BODY MASS INDEX: 22.13 KG/M2

## 2019-02-04 DIAGNOSIS — Z86.69 HX OF MIGRAINE HEADACHES: ICD-10-CM

## 2019-02-04 DIAGNOSIS — F41.9 ANXIETY AND DEPRESSION: ICD-10-CM

## 2019-02-04 DIAGNOSIS — F32.A ANXIETY AND DEPRESSION: ICD-10-CM

## 2019-02-04 DIAGNOSIS — Z00.00 PHYSICAL EXAM: Primary | ICD-10-CM

## 2019-02-04 PROCEDURE — 99213 OFFICE O/P EST LOW 20 MIN: CPT | Performed by: NURSE PRACTITIONER

## 2019-02-04 RX ORDER — SERTRALINE HYDROCHLORIDE 25 MG/1
TABLET, FILM COATED ORAL
Qty: 60 TABLET | Refills: 1 | Status: SHIPPED | OUTPATIENT
Start: 2019-02-04 | End: 2019-04-09 | Stop reason: SDUPTHER

## 2019-02-04 RX ORDER — TOPIRAMATE 50 MG/1
TABLET, FILM COATED ORAL
COMMUNITY
Start: 2019-01-15 | End: 2022-06-22

## 2019-02-04 RX ORDER — MAGNESIUM OXIDE 400 MG/1
400 TABLET ORAL DAILY
Qty: 30 TABLET | Refills: 1 | Status: SHIPPED | OUTPATIENT
Start: 2019-02-04 | End: 2019-05-09 | Stop reason: SDUPTHER

## 2019-02-04 RX ORDER — ZOLMITRIPTAN 5 MG/1
5 TABLET, FILM COATED ORAL
COMMUNITY
Start: 2019-01-15 | End: 2020-10-12

## 2019-02-04 NOTE — PROGRESS NOTES
Subjective   Daija Dean is a 28 y.o. female.   PMHX:Migraine headaches, is being followed by [facility], [facility] in Dr Guevara's office  Is on topamax.  Anxiety and depression  PSHX: wisdom tooth extraction, breast augmentation and diagnostic laparoscopy  PFHX:: several family members with breast cancer  Is followed by OB  Well balanced diet  Exercises a lot  Is a     History of Present Illness     The following portions of the patient's history were reviewed and updated as appropriate: allergies, current medications, past family history, past medical history, past social history, past surgical history and problem list.    Review of Systems   Constitutional: Positive for activity change. Negative for appetite change and fatigue.   HENT: Negative for congestion and postnasal drip.    Respiratory: Negative for cough.    Neurological: Positive for headaches. Negative for dizziness.       Objective   Physical Exam   Constitutional: She is oriented to person, place, and time. She appears well-developed and well-nourished.   HENT:   Head: Normocephalic and atraumatic.   Right Ear: External ear normal.   Left Ear: External ear normal.   Mouth/Throat: Oropharynx is clear and moist.   Eyes: EOM are normal.   Cardiovascular: Normal rate and regular rhythm.   Pulmonary/Chest: Effort normal and breath sounds normal.   Abdominal: Soft. Bowel sounds are normal.   Musculoskeletal: Normal range of motion.   Neurological: She is alert and oriented to person, place, and time.   Skin: Skin is warm.   Nursing note and vitals reviewed.      Assessment/Plan     Physical exam  Hx of migraine headaches  Anxiety and depression  Will let us know how the zoloft is working

## 2019-03-20 ENCOUNTER — OFFICE VISIT (OUTPATIENT)
Dept: OBSTETRICS AND GYNECOLOGY | Facility: CLINIC | Age: 29
End: 2019-03-20

## 2019-03-20 ENCOUNTER — PROCEDURE VISIT (OUTPATIENT)
Dept: OBSTETRICS AND GYNECOLOGY | Facility: CLINIC | Age: 29
End: 2019-03-20

## 2019-03-20 VITALS
DIASTOLIC BLOOD PRESSURE: 80 MMHG | SYSTOLIC BLOOD PRESSURE: 100 MMHG | HEIGHT: 67 IN | BODY MASS INDEX: 22.38 KG/M2 | WEIGHT: 142.6 LBS

## 2019-03-20 DIAGNOSIS — R31.9 HEMATURIA, UNSPECIFIED TYPE: ICD-10-CM

## 2019-03-20 DIAGNOSIS — T83.32XA INTRAUTERINE CONTRACEPTIVE DEVICE THREADS LOST, INITIAL ENCOUNTER: ICD-10-CM

## 2019-03-20 DIAGNOSIS — Z01.419 WELL WOMAN EXAM WITH ROUTINE GYNECOLOGICAL EXAM: Primary | ICD-10-CM

## 2019-03-20 DIAGNOSIS — Z97.5 IUD (INTRAUTERINE DEVICE) IN PLACE: ICD-10-CM

## 2019-03-20 DIAGNOSIS — T83.32XS INTRAUTERINE CONTRACEPTIVE DEVICE THREADS LOST, SEQUELA: Primary | ICD-10-CM

## 2019-03-20 PROBLEM — N89.8 VAGINAL DISCHARGE: Status: RESOLVED | Noted: 2018-06-27 | Resolved: 2019-03-20

## 2019-03-20 PROBLEM — F32.A ANXIETY AND DEPRESSION: Status: ACTIVE | Noted: 2019-03-20

## 2019-03-20 PROBLEM — G43.109 MIGRAINE WITH AURA: Status: ACTIVE | Noted: 2019-03-20

## 2019-03-20 PROBLEM — F41.9 ANXIETY AND DEPRESSION: Status: ACTIVE | Noted: 2019-03-20

## 2019-03-20 PROBLEM — Z80.41 FAMILY HISTORY OF OVARIAN CANCER: Status: ACTIVE | Noted: 2019-03-20

## 2019-03-20 PROBLEM — N83.202 LEFT OVARIAN CYST: Status: RESOLVED | Noted: 2018-06-27 | Resolved: 2019-03-20

## 2019-03-20 PROCEDURE — 76830 TRANSVAGINAL US NON-OB: CPT | Performed by: OBSTETRICS & GYNECOLOGY

## 2019-03-20 PROCEDURE — 99213 OFFICE O/P EST LOW 20 MIN: CPT | Performed by: OBSTETRICS & GYNECOLOGY

## 2019-03-20 PROCEDURE — 81025 URINE PREGNANCY TEST: CPT | Performed by: OBSTETRICS & GYNECOLOGY

## 2019-03-20 PROCEDURE — 81002 URINALYSIS NONAUTO W/O SCOPE: CPT | Performed by: OBSTETRICS & GYNECOLOGY

## 2019-03-20 PROCEDURE — 99395 PREV VISIT EST AGE 18-39: CPT | Performed by: OBSTETRICS & GYNECOLOGY

## 2019-03-20 NOTE — PROGRESS NOTES
GYN Annual Exam     CC- Here for annual exam.     Daija Dean is a 28 y.o. female established patient who presents for annual well woman exam. No cycles with Mirena, which was replaced in [place] in 3/2018.  She can't feel her IUD strings. On Zoloft and her anxiety is better. She wants to continue with her Mirena and not change to the Kyleena.    OB History      Para Term  AB Living    0 0 0 0 0 0    SAB TAB Ectopic Molar Multiple Live Births    0 0 0 0 0 0        Obstetric Comments    No plans          Menarche: 11  Current contraception: IUD- mirena 3/2018  History of abnormal Pap smear: yes - 1 abnormal with normal f/u  History of abnormal mammogram: no  Family history of uterine, colon or ovarian cancer: yes - M great aunt ovarian , pt is BRCA neg  Family history of breast cancer: yes - 2 paternal great aunts> 50  H/o STDs: h/o genital warts  Gardasil: 3/3    Health Maintenance   Topic Date Due   • ANNUAL PHYSICAL  1991   • TDAP/TD VACCINES (1 - Tdap) 2007   • PAP SMEAR  2018   • INFLUENZA VACCINE  2018       Past Medical History:   Diagnosis Date   • Abnormal Pap smear of cervix     h/o abnormal w/ nl f/u   • Anxiety and depression 3/20/2019   • Depression    • Headache    • History of genital warts     s/p TCA   • Migraine     migraines with aura   • Migraine with aura 3/20/2019   • Ovarian cyst 2018    h/o L ovarian cystectomy       Past Surgical History:   Procedure Laterality Date   • BREAST SURGERY      saline implants   • DIAGNOSTIC LAPAROSCOPY Left 2018    Procedure: DIAGNOSTIC LAPAROSCOPY with left ovarian cystectomy;  Surgeon: Brittani Gregg DO;  Location: [facility];  Service: Obstetrics/Gynecology   • WISDOM TOOTH EXTRACTION           Current Outpatient Medications:   •  cetirizine (zyrTEC) 10 MG tablet, Take 10 mg by mouth Daily., Disp: , Rfl:   •  Fluticasone Furoate-Vilanterol (BREO ELLIPTA) 100-25 MCG/INH inhaler, Inhale., Disp: , Rfl:   •   "levonorgestrel (MIRENA) 20 MCG/24HR IUD, 1 each by Intrauterine route., Disp: , Rfl:   •  magnesium oxide (MAG-OX) 400 MG tablet, Take 1 tablet by mouth Daily., Disp: 30 tablet, Rfl: 1  •  sertraline (ZOLOFT) 25 MG tablet, 1 tablet po daily for 1 week, then 2 tablets daily thereafter, Disp: 60 tablet, Rfl: 1  •  topiramate (TOPAMAX) 50 MG tablet, , Disp: , Rfl:   •  ZOLMitriptan (ZOMIG) 5 MG tablet, Take 5 mg by mouth., Disp: , Rfl:     Allergies   Allergen Reactions   • Decadron [Dexamethasone] Itching       Social History     Tobacco Use   • Smoking status: Never Smoker   • Smokeless tobacco: Never Used   Substance Use Topics   • Alcohol use: Yes     Comment: social   • Drug use: No       Family History   Problem Relation Age of Onset   • No Known Problems Father    • No Known Problems Mother    • Ovarian cancer Maternal Aunt 63        great mat aunt , pt is BRCA neg   • Breast cancer Paternal Aunt         great pat aunt    • Cancer Paternal Grandfather         bladder ca   • Breast cancer Paternal Aunt         great pat aunt   • Colon cancer Neg Hx    • Deep vein thrombosis Neg Hx    • Prostate cancer Neg Hx        Review of Systems   Constitutional: Negative for appetite change, fatigue, fever and unexpected weight change.   Respiratory: Negative for cough and shortness of breath.    Cardiovascular: Negative for chest pain and palpitations.   Gastrointestinal: Negative for abdominal distention, abdominal pain, constipation, diarrhea and nausea.   Endocrine: Negative for cold intolerance.   Genitourinary: Negative for dyspareunia, dysuria, menstrual problem, pelvic pain and vaginal discharge.   Skin: Negative for color change and rash.   Neurological: Positive for headaches (better on topomax).   Psychiatric/Behavioral: Negative for dysphoric mood. The patient is not nervous/anxious.    All other systems reviewed and are negative.      /80   Ht 170.2 cm (67\")   Wt 64.7 kg (142 lb 9.6 oz)   BMI 22.33 " kg/m²     Physical Exam   Constitutional: She is oriented to person, place, and time. She appears well-developed and well-nourished.   HENT:   Head: Normocephalic and atraumatic.   Eyes: Conjunctivae are normal. No scleral icterus.   Neck: Neck supple. No thyromegaly present.   Cardiovascular: Normal rate and regular rhythm.   Pulmonary/Chest: Effort normal and breath sounds normal. Right breast exhibits no inverted nipple, no mass, no nipple discharge, no skin change and no tenderness. Left breast exhibits no inverted nipple, no mass, no nipple discharge, no skin change and no tenderness.   B implants noted   Abdominal: Soft. Bowel sounds are normal. She exhibits no distension and no mass. There is no tenderness. There is no rebound and no guarding. No hernia.   Genitourinary: Uterus normal. Pelvic exam was performed with patient supine. There is no rash, tenderness or lesion on the right labia. There is no rash, tenderness or lesion on the left labia. Cervix exhibits no motion tenderness, no discharge and no friability. Right adnexum displays no mass, no tenderness and no fullness. Left adnexum displays no mass, no tenderness and no fullness. No erythema, tenderness or bleeding in the vagina. No foreign body in the vagina. No signs of injury around the vagina. No vaginal discharge found.   Genitourinary Comments: IUD string not seen   Neurological: She is alert and oriented to person, place, and time.   Skin: Skin is warm and dry.   Psychiatric: She has a normal mood and affect. Her behavior is normal. Judgment and thought content normal.   Nursing note and vitals reviewed.         Assessment/Plan    1) GYN HM: check pap/HPV   SBE demonstrated and encouraged.  2) STD screening: declines  Condoms encouraged.  3) Contraception: Mirena IUD placed 3/2018 in [facility] not seen. US today shows  8.29 cm uterus, RL 0.55 cm  and IUD in correct position. Normal ovaries. US compared to that on 8/29/18   4) Family  Planning: no plans at present, encourage folic acid daily  5) Diet and Exercise discussed  6) Smoking Status: non smoker  7) Social: , works from home  8) MMG-  Plan age 38   9)Follow up prn or 1 year  10) Hematuria on dip- check UA and CS.        Daija was seen today for gynecologic exam.    Diagnoses and all orders for this visit:    Well woman exam with routine gynecological exam  -     POC Urinalysis Dipstick  -     POC Pregnancy, Urine  -     Pap IG, HPV-hr    Hematuria, unspecified type  -     Urinalysis With Microscopic - Urine, Clean Catch  -     Urine Culture - Urine, Urine, Clean Catch    IUD (intrauterine device) in place- mirena 3/2018 [facility]    Intrauterine contraceptive device threads lost, initial encounter          Mariela Royal MD  3/20/2019  1:06 PM

## 2019-03-22 LAB
APPEARANCE UR: CLEAR
BACTERIA #/AREA URNS HPF: NORMAL /HPF
BACTERIA UR CULT: NO GROWTH
BACTERIA UR CULT: NORMAL
BILIRUB UR QL STRIP: NEGATIVE
CASTS URNS MICRO: NORMAL
COLOR UR: YELLOW
CYTOLOGIST CVX/VAG CYTO: ABNORMAL
CYTOLOGY CVX/VAG DOC THIN PREP: ABNORMAL
DX ICD CODE: ABNORMAL
EPI CELLS #/AREA URNS HPF: NORMAL /HPF
GLUCOSE UR QL: NEGATIVE
HGB UR QL STRIP: (no result)
HIV 1 & 2 AB SER-IMP: ABNORMAL
HPV I/H RISK 1 DNA CVX QL PROBE+SIG AMP: POSITIVE
KETONES UR QL STRIP: NEGATIVE
LEUKOCYTE ESTERASE UR QL STRIP: NEGATIVE
NITRITE UR QL STRIP: NEGATIVE
OTHER STN SPEC: ABNORMAL
PATH REPORT.FINAL DX SPEC: ABNORMAL
PH UR STRIP: 6.5 [PH] (ref 5–8)
PROT UR QL STRIP: NEGATIVE
RBC #/AREA URNS HPF: NORMAL /HPF
SP GR UR: 1.01 (ref 1–1.03)
STAT OF ADQ CVX/VAG CYTO-IMP: ABNORMAL
UROBILINOGEN UR STRIP-MCNC: (no result) MG/DL
WBC #/AREA URNS HPF: NORMAL /HPF

## 2019-03-24 NOTE — PROGRESS NOTES
PIP= her urine culture was normal and her urinalysis did not show any blood, so no further workup needs to be done. She had a normal pap but a + HPV, so she needs a repeat pap and HPV done in 1 year and if HPV is still positive she will need a colpo. Please place in colpo recall.

## 2019-04-11 RX ORDER — SERTRALINE HYDROCHLORIDE 25 MG/1
TABLET, FILM COATED ORAL
Qty: 60 TABLET | Refills: 5 | Status: SHIPPED | OUTPATIENT
Start: 2019-04-11 | End: 2019-10-01 | Stop reason: SDUPTHER

## 2019-05-09 RX ORDER — MAGNESIUM OXIDE 400 MG/1
400 TABLET ORAL DAILY
Qty: 30 TABLET | Refills: 1 | Status: SHIPPED | OUTPATIENT
Start: 2019-05-09 | End: 2021-02-23 | Stop reason: SDUPTHER

## 2019-06-13 ENCOUNTER — OFFICE VISIT (OUTPATIENT)
Dept: OBSTETRICS AND GYNECOLOGY | Facility: CLINIC | Age: 29
End: 2019-06-13

## 2019-06-13 VITALS
WEIGHT: 127 LBS | SYSTOLIC BLOOD PRESSURE: 110 MMHG | DIASTOLIC BLOOD PRESSURE: 76 MMHG | HEIGHT: 67 IN | BODY MASS INDEX: 19.93 KG/M2

## 2019-06-13 DIAGNOSIS — N64.9 NIPPLE LESION: Primary | ICD-10-CM

## 2019-06-13 PROCEDURE — 99213 OFFICE O/P EST LOW 20 MIN: CPT | Performed by: OBSTETRICS & GYNECOLOGY

## 2019-06-13 NOTE — PROGRESS NOTES
"      Daija Dean is a 28 y.o. patient who presents for follow up of   Chief Complaint   Patient presents with   • Breast Problem     bump to nipple area       29 yo est pt called for same day appt for L nipple pain and discharge. She noticed a few days ago that her L nipple was sore at the superior edge and extruded a thick white discharge. She is very concerned. She has not had any fevers, redness, galactorrhea.       The following portions of the patient's history were reviewed and updated as appropriate: allergies, current medications and problem list.    Review of Systems   Constitutional: Negative for activity change, chills and fever.   Eyes: Negative for photophobia and visual disturbance.   Skin:        + L nipple lesion   All other systems reviewed and are negative.      /76   Ht 170.2 cm (67.01\")   Wt 57.6 kg (127 lb)   BMI 19.89 kg/m²     Physical Exam   Constitutional: She is oriented to person, place, and time. She appears well-developed and well-nourished.   HENT:   Head: Normocephalic and atraumatic.   Pulmonary/Chest: Right breast exhibits inverted nipple. Right breast exhibits no mass, no nipple discharge, no skin change and no tenderness. Left breast exhibits inverted nipple, skin change and tenderness. Left breast exhibits no mass and no nipple discharge.       Neurological: She is alert and oriented to person, place, and time.   Skin: Skin is warm and dry.   Psychiatric: She has a normal mood and affect. Her behavior is normal. Judgment and thought content normal.   Nursing note and vitals reviewed.    A/P:  1. L nipple lesion- no e/o infection. Enc warm compresses TID and refer to breast surgery for evaluation/excision.       Assessment/Plan   Daija was seen today for breast problem.    Diagnoses and all orders for this visit:    Nipple lesion  -     Ambulatory Referral to Breast Surgery                   No Follow-up on file.      Mariela Royal MD    6/13/2019  8:03 PM  "

## 2019-06-14 ENCOUNTER — TELEPHONE (OUTPATIENT)
Dept: SURGERY | Facility: CLINIC | Age: 29
End: 2019-06-14

## 2019-06-14 DIAGNOSIS — N64.9 ABNORMAL NIPPLE: Primary | ICD-10-CM

## 2019-06-14 NOTE — TELEPHONE ENCOUNTER
Pt seeing us for nipple problem LEFT- note prior bilateral nipple release surgery  L US ordered and to see me after

## 2019-06-17 ENCOUNTER — TELEPHONE (OUTPATIENT)
Dept: SURGERY | Facility: CLINIC | Age: 29
End: 2019-06-17

## 2019-06-18 ENCOUNTER — HOSPITAL ENCOUNTER (OUTPATIENT)
Dept: ULTRASOUND IMAGING | Facility: HOSPITAL | Age: 29
Discharge: HOME | End: 2019-06-18

## 2019-06-18 DIAGNOSIS — N64.9 ABNORMAL NIPPLE: ICD-10-CM

## 2019-06-19 ENCOUNTER — TELEPHONE (OUTPATIENT)
Dept: SURGERY | Facility: CLINIC | Age: 29
End: 2019-06-19

## 2019-06-19 NOTE — TELEPHONE ENCOUNTER
Patient canceled breast U/S. We will need study prior to appt with Dr. Saab. I've left patient a voicemail to call us back. lml       Patient scheduled to see Dr. Saab Friday 6/21/19. She explained to me that her out of pocket cost for U/S is $900 with [facility].   She is requesting Dr. Saab see her in office without breast U/S. lml     Confirmed with patient okay to come in without US. Lml

## 2019-06-21 ENCOUNTER — TELEPHONE (OUTPATIENT)
Dept: SURGERY | Facility: CLINIC | Age: 29
End: 2019-06-21

## 2019-06-21 NOTE — TELEPHONE ENCOUNTER
Daija called to let us know the the nipple lesion she was referred back for is gone. She wants to cancel appt with us for today and does not need to reschedule for this.     She explained that it looked more like a wilson last night so she popped it and it is gone.     I've called Dr. Royal's office and let them know.     lml

## 2019-09-05 ENCOUNTER — TELEPHONE (OUTPATIENT)
Dept: OBSTETRICS AND GYNECOLOGY | Facility: CLINIC | Age: 29
End: 2019-09-05

## 2019-09-05 RX ORDER — METRONIDAZOLE 500 MG/1
500 TABLET ORAL 2 TIMES DAILY
Qty: 14 TABLET | Refills: 0 | Status: SHIPPED | OUTPATIENT
Start: 2019-09-05 | End: 2019-09-12

## 2019-09-05 NOTE — TELEPHONE ENCOUNTER
PT states she has had sex with a new person and every time she does she gets BV she was wondering if you could call in something for her please.

## 2019-09-24 RX ORDER — CLINDAMYCIN HYDROCHLORIDE 300 MG/1
300 CAPSULE ORAL 3 TIMES DAILY
Qty: 12 CAPSULE | Refills: 0 | Status: SHIPPED | OUTPATIENT
Start: 2019-09-24 | End: 2019-09-28

## 2019-09-24 NOTE — TELEPHONE ENCOUNTER
Pt states the flagyl did not help her, she had bad side effects from it, and would like clindamycin called in. Can we call this in or do we need to see her?

## 2019-10-01 RX ORDER — SERTRALINE HYDROCHLORIDE 25 MG/1
TABLET, FILM COATED ORAL
Qty: 60 TABLET | Refills: 4 | Status: SHIPPED | OUTPATIENT
Start: 2019-10-01 | End: 2020-01-06 | Stop reason: SDUPTHER

## 2019-11-19 ENCOUNTER — OFFICE VISIT (OUTPATIENT)
Dept: OBSTETRICS AND GYNECOLOGY | Facility: CLINIC | Age: 29
End: 2019-11-19

## 2019-11-19 VITALS — WEIGHT: 131.6 LBS | BODY MASS INDEX: 20.65 KG/M2 | HEIGHT: 67 IN

## 2019-11-19 DIAGNOSIS — Z20.2 EXPOSURE TO STD: Primary | ICD-10-CM

## 2019-11-19 PROCEDURE — 99213 OFFICE O/P EST LOW 20 MIN: CPT | Performed by: OBSTETRICS & GYNECOLOGY

## 2019-11-19 RX ORDER — FLUCONAZOLE 150 MG/1
TABLET ORAL
Refills: 0 | COMMUNITY
Start: 2019-10-01 | End: 2020-10-12

## 2019-11-19 RX ORDER — FLUCONAZOLE 100 MG/1
TABLET ORAL
Refills: 0 | COMMUNITY
Start: 2019-10-03 | End: 2020-10-12

## 2019-11-19 RX ORDER — KETOCONAZOLE 20 MG/ML
SHAMPOO TOPICAL
Refills: 0 | COMMUNITY
Start: 2019-10-03 | End: 2020-10-12

## 2019-11-19 RX ORDER — LANOLIN ALCOHOL/MO/W.PET/CERES
1 CREAM (GRAM) TOPICAL
Refills: 6 | COMMUNITY
Start: 2019-09-05 | End: 2021-02-23 | Stop reason: SDUPTHER

## 2019-11-19 NOTE — PROGRESS NOTES
"PROBLEM VISIT    Chief Complaint: STD testing      Daija Dean is a 29 y.o. patient who presents for STD testing. Pt has a new partner. She is using Kyleena IUD for contraception. She has not noticed any abnormal discharges or anything concerning.  Chief Complaint   Patient presents with   • Exposure to STD             The following portions of the patient's history were reviewed and updated as appropriate: allergies, current medications and problem list.    Review of Systems   Genitourinary: Negative for dyspareunia, menstrual problem, pelvic pain and vaginal discharge.       Ht 170.2 cm (67.01\")   Wt 59.7 kg (131 lb 9.6 oz)   Breastfeeding? No   BMI 20.61 kg/m²     Physical Exam   Constitutional: She appears well-developed and well-nourished. No distress.   Genitourinary: There is no rash, tenderness, lesion or injury on the right labia. There is no rash, tenderness, lesion or injury on the left labia. Cervix exhibits no motion tenderness, no discharge and no friability. No erythema, tenderness or bleeding in the vagina.   There is a foreign body in the vagina. No signs of injury around the vagina. No vaginal discharge found.   Neurological: She is alert. No cranial nerve deficit. Coordination normal.   Skin: Skin is warm. No rash noted. She is not diaphoretic. No erythema. No pallor.   Psychiatric: She has a normal mood and affect. Her behavior is normal. Judgment and thought content normal.   Vitals reviewed.        Assessment/Plan   Daija was seen today for exposure to std.    Diagnoses and all orders for this visit:    Exposure to STD  -     Chlamydia trachomatis, Neisseria gonorrhoeae, Trichomonas vaginalis, PCR - Urine, Urine, Clean Catch  -     Hepatitis B Surface Antigen  -     Hepatitis C Antibody  -     HIV-1 / O / 2 Ag / Antibody 4th Generation  -     HSV 1 & 2 - Specific Antibody, IgG  -     RPR    28yo for STD screening    1) STD screening: Check full panel.    2) Contraception: Kyleena " IUD    3) gyn HM: normal pap but +HR HPV 3/2019. Needs pap 3/2020               Return in about 4 months (around 3/19/2020).      Brittani Gregg DO    11/19/2019  2:34 PM

## 2019-11-20 LAB
HBV SURFACE AG SERPL QL IA: NEGATIVE
HCV AB S/CO SERPL IA: <0.1 S/CO RATIO (ref 0–0.9)
HIV 1+2 AB+HIV1 P24 AG SERPL QL IA: NON REACTIVE
HSV1 IGG SER IA-ACNC: 58.7 INDEX (ref 0–0.9)
HSV2 IGG SER IA-ACNC: <0.91 INDEX (ref 0–0.9)
RPR SER QL: NON REACTIVE

## 2019-11-21 ENCOUNTER — TELEPHONE (OUTPATIENT)
Dept: SURGERY | Facility: CLINIC | Age: 29
End: 2019-11-21

## 2019-11-21 LAB
C TRACH RRNA SPEC QL NAA+PROBE: NEGATIVE
N GONORRHOEA RRNA SPEC QL NAA+PROBE: NEGATIVE
T VAGINALIS DNA SPEC QL NAA+PROBE: NEGATIVE

## 2019-11-21 NOTE — TELEPHONE ENCOUNTER
Scheduled Left Breast Ultrasond  [facility] on 11-27-19 arrive 8:45    Return to see Dr HOLLAND 1-14-20 arrive 8:15      Pt is aware of appointments.  berna

## 2019-11-22 RX ORDER — VALACYCLOVIR HYDROCHLORIDE 1 G/1
2000 TABLET, FILM COATED ORAL 2 TIMES DAILY
Qty: 4 TABLET | Refills: 6 | Status: SHIPPED | OUTPATIENT
Start: 2019-11-22 | End: 2019-11-23

## 2019-11-27 ENCOUNTER — TELEPHONE (OUTPATIENT)
Dept: SURGERY | Facility: CLINIC | Age: 29
End: 2019-11-27

## 2019-11-27 ENCOUNTER — HOSPITAL ENCOUNTER (OUTPATIENT)
Dept: ULTRASOUND IMAGING | Facility: HOSPITAL | Age: 29
Discharge: HOME | End: 2019-11-27
Admitting: SURGERY

## 2019-11-27 DIAGNOSIS — N64.9 ABNORMAL NIPPLE: ICD-10-CM

## 2019-11-27 PROCEDURE — 76642 ULTRASOUND BREAST LIMITED: CPT

## 2019-11-27 NOTE — TELEPHONE ENCOUNTER
Patient seen 10/2018 for Mastodynia     Referred back to our office in 6/2019 for left nipple lesion    Patient canceled US and follow up due to insurance and out of pocket cost  Patient referred back again.     We rescheduled her missed US and follow up here.     Isabelle in [place] called to let us know patient c/o sterling clear nipple discharge. [facility] would like Bilateral US, rather than Left only. [facility] Dept will put in order for us to sign off on.   Follow up with us scheduled for 1/2020.  Nelly

## 2019-11-29 ENCOUNTER — TELEPHONE (OUTPATIENT)
Dept: SURGERY | Facility: CLINIC | Age: 29
End: 2019-11-29

## 2019-11-29 DIAGNOSIS — N64.52 NIPPLE DISCHARGE: Primary | ICD-10-CM

## 2019-11-29 NOTE — TELEPHONE ENCOUNTER
\Radiologist recommended bilateral mammogram as part of her workup-we will arrange and see her back after    [facility] bilateral screening mammogram with 3D- December 5, 2019 bilateral breast implants in position satisfactory in appearance.  Heterogenous Chase dense tissue.  Negative BI-RADS 1

## 2019-12-02 ENCOUNTER — TELEPHONE (OUTPATIENT)
Dept: SURGERY | Facility: CLINIC | Age: 29
End: 2019-12-02

## 2019-12-02 NOTE — TELEPHONE ENCOUNTER
Scheduled Bilateral Diagnostic Mammogram at [facility] 12-5-19  Arrive 9:15.    berna Arciniega on patients phone.

## 2019-12-05 ENCOUNTER — HOSPITAL ENCOUNTER (OUTPATIENT)
Dept: MAMMOGRAPHY | Facility: HOSPITAL | Age: 29
Discharge: HOME | End: 2019-12-05
Admitting: SURGERY

## 2019-12-05 DIAGNOSIS — N64.52 NIPPLE DISCHARGE: ICD-10-CM

## 2019-12-05 PROCEDURE — 77066 DX MAMMO INCL CAD BI: CPT

## 2019-12-05 PROCEDURE — G0279 TOMOSYNTHESIS, MAMMO: HCPCS

## 2020-01-06 RX ORDER — SERTRALINE HYDROCHLORIDE 25 MG/1
TABLET, FILM COATED ORAL
Qty: 60 TABLET | Refills: 0 | Status: SHIPPED | OUTPATIENT
Start: 2020-01-06 | End: 2020-03-06 | Stop reason: SDUPTHER

## 2020-01-14 ENCOUNTER — OFFICE VISIT (OUTPATIENT)
Dept: SURGERY | Facility: CLINIC | Age: 30
End: 2020-01-14

## 2020-01-14 VITALS
WEIGHT: 138 LBS | DIASTOLIC BLOOD PRESSURE: 79 MMHG | OXYGEN SATURATION: 98 % | HEART RATE: 69 BPM | HEIGHT: 67 IN | BODY MASS INDEX: 21.66 KG/M2 | SYSTOLIC BLOOD PRESSURE: 124 MMHG

## 2020-01-14 DIAGNOSIS — N64.52 NIPPLE DISCHARGE: Primary | ICD-10-CM

## 2020-01-14 DIAGNOSIS — Z80.3 FH: BREAST CANCER: ICD-10-CM

## 2020-01-14 DIAGNOSIS — Z80.41 FH: OVARIAN CANCER: ICD-10-CM

## 2020-01-14 PROCEDURE — 99213 OFFICE O/P EST LOW 20 MIN: CPT | Performed by: SURGERY

## 2020-01-14 NOTE — PROGRESS NOTES
Chief Complaint: Daija Dean is a 29 y.o. female who was seen in consultation at the request of Mariela Royal MD  for genetic eval and family history breast cancer    History of Present Illness:  Patient presents with breast pain and management of breast cancer risk. She noted no new masses, skin changes, nipple discharge, nipple changes prior to her most recent imaging.  In March 2018 she had her IUD replaced.  A Mirena.  Thereafter she began to experience breast enlargement as well as increased nipple sensitivity and bilateral breast discomfort.  She tells me that this has significantly improved.     She has had no breast imaging.  She has had a bilateral breast augmentation as well as a bilateral inverted nipple release.  She cannot remember what type of implants she has nor the location.    She has not had a breast biopsy in the past.  She has her uterus and ovaries, is premenopausal, and has a Mirena.  Her family history includes the following: She has no children, no sisters, one maternal aunt, 2 paternal aunts.  She has 2 paternal great aunts who had breast cancer in one maternal great aunt who had primary peritoneal cancer.    Interval History:  In the interim,  Daija Dean  has done well.  She had noted a white skin lesion on her areola on the left but this has resolved.  She has noted bilateral small volume creamy white drainage from her nipples bilaterally only with manipulation and squeezing them with attempted elicitation.    She has noted no other changes in her breast exam. No new masses, skin changes, nipple changes, nipple discharge either breast.   She denies headache, bone pain, belly pain, cough, changes in vision or gait.  Her most recent imaging includes the following:    Bilateral breast ultrasound November 27, 2019 [facility] done for bilateral nipple discharge.  Targeted ultrasound of the retroareolar regions of both breasts was performed.  Only  normal-appearing breast parenchyma is appreciated.  BI-RADS 0  Radiologist recommended bilateral mammogram as part of her workup-we will arrange and see her back after     [facility] bilateral screening mammogram with 3D- December 5, 2019 bilateral breast implants in position satisfactory in appearance.  Heterogenous Chase dense tissue.  Negative BI-RADS 1      He is here for review.    Review of Systems:  Review of Systems   Musculoskeletal: Positive for arthralgias and myalgias.   Neurological: Positive for light-headedness.   Psychiatric/Behavioral: Positive for depression and sleep disturbance. The patient is nervous/anxious.    All other systems reviewed and are negative.       Past Medical and Surgical History:  Breast Biopsy History:  Patient has not had a breast biopsy in the past.  Breast Cancer HIstory:  Patient does not have a past medical history of breast cancer.  Breast Operations, and year:  augmentation  Obstetric/Gynecologic History:  Age menstrual periods began: 12  Patient is premenopausal, first day of last period: unknown IUD  Number of pregnancies: 0  Number of live births: 0  Number of abortions or miscarriages: 0  Age of delivery of first child: n/a   Breast feeding: n/a   Length of time taking birth control pills: since age 12, currently IUD  Patient has never taken hormone replacement  Patient still has uterus and ovaries.     Past Surgical History:   Procedure Laterality Date   • AUGMENTATION MAMMAPLASTY     • BREAST SURGERY      saline implants   • DIAGNOSTIC LAPAROSCOPY Left 6/27/2018    Procedure: DIAGNOSTIC LAPAROSCOPY with left ovarian cystectomy;  Surgeon: Brittani Gregg DO;  Location: [facility];  Service: Obstetrics/Gynecology   • WISDOM TOOTH EXTRACTION         Past Medical History:   Diagnosis Date   • Abnormal Pap smear of cervix     h/o abnormal w/ nl f/u   • Anxiety and depression 3/20/2019   • Depression    • Headache    • History of genital warts     s/p TCA  "  • Migraine     migraines with aura   • Migraine with aura 3/20/2019   • Ovarian cyst 06/2018    h/o L ovarian cystectomy       Prior Hospitalizations, other than for surgery or childbirth, and year:  None    Social History     Socioeconomic History   • Marital status: Single     Spouse name: Not on file   • Number of children: Not on file   • Years of education: Not on file   • Highest education level: Not on file   Tobacco Use   • Smoking status: Never Smoker   • Smokeless tobacco: Never Used   Substance and Sexual Activity   • Alcohol use: Yes     Comment: social   • Drug use: No   • Sexual activity: Yes     Partners: Male     Birth control/protection: IUD     Comment: Mirena placed 3/2018     Patient is single.  Patient is employed full time with the following occupation:   Patient drinks 2 servings of caffeine per day.    Family History:  Family History   Problem Relation Age of Onset   • No Known Problems Father    • No Known Problems Mother    • Ovarian cancer Maternal Aunt 63        great mat aunt , pt is BRCA neg   • Breast cancer Paternal Aunt         great pat aunt    • Cancer Paternal Grandfather         bladder ca   • Breast cancer Paternal Aunt         great pat aunt   • Colon cancer Neg Hx    • Deep vein thrombosis Neg Hx    • Prostate cancer Neg Hx        Vital Signs:  /79   Pulse 69   Ht 170.2 cm (67\")   Wt 62.6 kg (138 lb)   LMP  (LMP Unknown)   SpO2 98%   Breastfeeding No   BMI 21.61 kg/m²      Medications:    Current Outpatient Medications:   •  cetirizine (zyrTEC) 10 MG tablet, Take 10 mg by mouth Daily., Disp: , Rfl:   •  fluconazole (DIFLUCAN) 100 MG tablet, TAKE 2 TABLETS BY MOUTH TODAY, THEN 1 DAILY UNTIL GONE, Disp: , Rfl: 0  •  fluconazole (DIFLUCAN) 150 MG tablet, TAKE 1 TABLET EVERY 72 HOURS FOR 2 DOSES., Disp: , Rfl: 0  •  ketoconazole (NIZORAL) 2 % shampoo, APPLY TO SCALP 3 TIMES WEEKLY AS NEEDED, Disp: , Rfl: 0  •  levonorgestrel (MIRENA) 20 MCG/24HR IUD, 1 each " "by Intrauterine route., Disp: , Rfl:   •  magnesium oxide (MAG-OX) 400 MG tablet, Take 1 tablet by mouth Daily., Disp: 30 tablet, Rfl: 1  •  Magnesium Oxide 400 (240 Mg) MG tablet, Take 1 tablet by mouth every night at bedtime., Disp: , Rfl: 6  •  sertraline (ZOLOFT) 25 MG tablet, TAKE 2 TABLETS DAILY THEREAFTER, Disp: 60 tablet, Rfl: 0  •  topiramate (TOPAMAX) 50 MG tablet, , Disp: , Rfl:   •  ZOLMitriptan (ZOMIG) 5 MG tablet, Take 5 mg by mouth., Disp: , Rfl:   •  Fluticasone Furoate-Vilanterol (BREO ELLIPTA) 100-25 MCG/INH inhaler, Inhale., Disp: , Rfl:      Allergies:  Allergies   Allergen Reactions   • Decadron [Dexamethasone] Itching       Physical Examination:  /79   Pulse 69   Ht 170.2 cm (67\")   Wt 62.6 kg (138 lb)   LMP  (LMP Unknown)   SpO2 98%   Breastfeeding No   BMI 21.61 kg/m²   General Appearance:  Patient is in no distress.  She is well kept and has an average build.   Psychiatric:  Patient with appropriate mood and affect. Alert and oriented to self, time, and place.    Breast, RIGHT:  large sized, augmented, symmetric with the contralateral side.  Breast skin is without erythema, edema, rashes.  There are no visible abnormalities upon inspection during the arm-raising maneuver or with hands on hips in the sitting position. There is no nipple retraction, discharge or nipple/areolar skin changes.specifically with much effort today there is no nipple discharge.  There are no masses palpable in the sitting or supine positions.  There are bilateral transverse incisions across her areola as well as bilateral inframammary incisions from her inverted nipple correction and her augmentation, respectively.    Breast, LEFT:  large sized, augmented, symmetric with the contralateral side.  Breast skin is without erythema, edema, rashes.  There are no visible abnormalities upon inspection during the arm-raising maneuver or with hands on hips in the sitting position. There is no nipple retraction, " discharge or nipple/areolar skin changes.specifically with much effort today, there is no nipple discharge.  There are no masses palpable in the sitting or supine positions. There are bilateral transverse incisions across her areola as well as bilateral inframammary incisions from her inverted nipple correction and her augmentation, respectively.    Lymphatic:  There is no axillary, cervical, infraclavicular, or supraclavicular adenopathy bilaterally.  Eyes:  Pupils are round and reactive to light.  Cardiovascular:  Heart rate and rhythm are regular.  Respiratory:  Lungs are clear bilaterally with no crackles or wheezes in any lung field.  Gastrointestinal:  Abdomen is soft, nondistended, and nontender.     Musculoskeletal:  Good strength in all 4 extremities.   There is good range of motion in both shoulders.    Skin:  No new skin lesions or rashes on the skin excluding the breast (see breast exam above).    IMAGING:  Bilateral breast ultrasound November 27, 2019 [facility] done for bilateral nipple discharge.  Targeted ultrasound of the retroareolar regions of both breasts was performed.  Only normal-appearing breast parenchyma is appreciated.  BI-RADS 0  Radiologist recommended bilateral mammogram as part of her workup-we will arrange and see her back after     [facility] bilateral screening mammogram with 3D- December 5, 2019 bilateral breast implants in position satisfactory in appearance.  Heterogenous Chase dense tissue.  Negative BI-RADS 1      Labs:     6/25/18 [facility]  [facility]ET[facility]  VIGNESH MCCRACKEN  VARIANTS OF UNCERTAIN SIGNIFICANCE.  NBN C.643C>T ([id number]).      Procedures:      Assessment:   Diagnosis Plan   1. Nipple discharge     2. FH: breast cancer     3. FH: ovarian cancer          1-  bilateral elicited, small volume, white creamy-likely related to prior surgery from nipple inversion and from manipulation    2-  PGA x2    3-  MGA    Plan:  Edith,  and I reviewed  her interval history, examination, imaging reports and imaging together today.  There is no present skin lesion on her areola.  This resolved spontaneously.  With regards to her nipple discharge, we discussed that the type of nipple discharge that is pathologic in nature is often unilateral spontaneous frequent and reproducible on examination.  Her nipple discharge is a white creamy that is difficult to elicit and only seen when she squeezes the nipple and search for this.  Based on her past history of nipple inversion and corrective procedure, she may have some chronic inflammation of the ducts behind her nipple.  However if she quits squeezing the nipples I told her that we will likely see no additional discharge.  We discussed the benign clinical nature of the discharge.  Her imaging is in good order.      Previously had the following discussion about her risk assessment:  We discussed that her risk assessment as done by the [employer] is inaccurate, because they have her family history listed as a paternal aunt with ovarian cancer and a paternal aunt with breast cancer.  In fact she has a maternal great aunt with ovarian cancer and 2 paternal great aunts with breast cancer.  Her current risk assessment model's do not go beyond grandparent or cousin, as beyond that the distance genetically is not found to be significant if there are not closer affected relatives.  In light of this she would be considered to have general population risk in the absence of a mutation.  We discussed that this risk is 12%.  For a risk of 12% for screening, we recommend bilateral screening mammogram with 3-D starting annually at age 40.  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    We discussed the various of uncertain significance do not have clinical meaning until they are reclassified as benign or deleterious.  The majority of variants of uncertain significance Reclassified as  benign.  I did suggest that she check with the [employer] about every 5 years or so to see if that the area has been updated.        With a normal examination and normal imaging today, I have not given her a routine follow-up in our office.  I asked her to continue her self breast exam and to call us in the future with any concerns or changes and we be happy to see her back.      Sade Saab MD    15 min visit, 9 face to face   \  Next Appointment:  Return for any future concerns.      EMR Dragon/transcription disclaimer:    Much of this encounter note is an electronic transcription/translocation of spoken language to printed text.  The electronic translation of spoken language may permit erroneous, or at times, nonsensical words or phrases to be inadvertently transcribed.  Although I have reviewed the note from such areas, some may still exist.

## 2020-03-24 RX ORDER — CLINDAMYCIN HYDROCHLORIDE 300 MG/1
300 CAPSULE ORAL 2 TIMES DAILY
Qty: 14 CAPSULE | Refills: 1 | Status: SHIPPED | OUTPATIENT
Start: 2020-03-24 | End: 2020-03-31

## 2020-06-24 ENCOUNTER — OFFICE VISIT (OUTPATIENT)
Dept: OBSTETRICS AND GYNECOLOGY | Facility: CLINIC | Age: 30
End: 2020-06-24

## 2020-06-24 ENCOUNTER — PROCEDURE VISIT (OUTPATIENT)
Dept: OBSTETRICS AND GYNECOLOGY | Facility: CLINIC | Age: 30
End: 2020-06-24

## 2020-06-24 VITALS
SYSTOLIC BLOOD PRESSURE: 118 MMHG | BODY MASS INDEX: 21.35 KG/M2 | DIASTOLIC BLOOD PRESSURE: 76 MMHG | WEIGHT: 136 LBS | HEIGHT: 67 IN

## 2020-06-24 DIAGNOSIS — R10.2 PELVIC PAIN IN FEMALE: Primary | ICD-10-CM

## 2020-06-24 DIAGNOSIS — K59.00 CONSTIPATION, UNSPECIFIED CONSTIPATION TYPE: ICD-10-CM

## 2020-06-24 DIAGNOSIS — Z30.431 IUD CHECK UP: ICD-10-CM

## 2020-06-24 DIAGNOSIS — Z13.9 SCREENING FOR CONDITION: Primary | ICD-10-CM

## 2020-06-24 DIAGNOSIS — R10.32 LLQ PAIN: ICD-10-CM

## 2020-06-24 LAB
B-HCG UR QL: NEGATIVE
BILIRUB BLD-MCNC: NEGATIVE MG/DL
CLARITY, POC: CLEAR
COLOR UR: YELLOW
GLUCOSE UR STRIP-MCNC: NEGATIVE MG/DL
INTERNAL NEGATIVE CONTROL: NEGATIVE
INTERNAL POSITIVE CONTROL: POSITIVE
KETONES UR QL: NEGATIVE
LEUKOCYTE EST, POC: NEGATIVE
Lab: NORMAL
NITRITE UR-MCNC: NEGATIVE MG/ML
PH UR: 6 [PH] (ref 5–8)
PROT UR STRIP-MCNC: NEGATIVE MG/DL
RBC # UR STRIP: NEGATIVE /UL
SP GR UR: 1.02 (ref 1–1.03)
UROBILINOGEN UR QL: NORMAL

## 2020-06-24 PROCEDURE — 81002 URINALYSIS NONAUTO W/O SCOPE: CPT | Performed by: OBSTETRICS & GYNECOLOGY

## 2020-06-24 PROCEDURE — 76830 TRANSVAGINAL US NON-OB: CPT | Performed by: OBSTETRICS & GYNECOLOGY

## 2020-06-24 PROCEDURE — 99214 OFFICE O/P EST MOD 30 MIN: CPT | Performed by: OBSTETRICS & GYNECOLOGY

## 2020-06-24 PROCEDURE — 81025 URINE PREGNANCY TEST: CPT | Performed by: OBSTETRICS & GYNECOLOGY

## 2020-06-24 RX ORDER — DAPSONE 75 MG/G
GEL TOPICAL
COMMUNITY
Start: 2020-04-06 | End: 2022-06-22

## 2020-06-24 NOTE — PROGRESS NOTES
"      Daija Dean is a 29 y.o. patient who presents for follow up of   Chief Complaint   Patient presents with   • Pelvic Pain     U/S       30 yo est pt here for emergency appt for bloating and left sided abdominal pain. She starting having a feeling of pelvic fullness about 3 weeks ago. She has always had a history of alternating constipation and diarrhea and these symptoms have been getting worse over time. She is currently constipated. She also has a h/o large ovarian cysts so we did a pelvic US today. Her US today shows an 8 cm uterus , EL 0.5 cm and the IUD is in the correct place. The ovaries are normal and no cysts were seen. US compared to scan on 3/20/2019. She is interested in seeing GI for a workup.       The following portions of the patient's history were reviewed and updated as appropriate: allergies, current medications and problem list.    Review of Systems   Constitutional: Positive for activity change (quarantine). Negative for appetite change, fatigue, fever and unexpected weight change.   Eyes: Negative for photophobia and visual disturbance.   Respiratory: Negative for cough and shortness of breath.    Cardiovascular: Negative for chest pain and palpitations.   Gastrointestinal: Positive for abdominal distention, abdominal pain, constipation and diarrhea. Negative for nausea.   Endocrine: Negative for cold intolerance and heat intolerance.   Genitourinary: Positive for pelvic pain. Negative for dyspareunia, dysuria, menstrual problem and vaginal discharge.   Musculoskeletal: Negative for back pain.   Skin: Negative for color change and rash.   Neurological: Negative for headaches.   Hematological: Negative for adenopathy. Does not bruise/bleed easily.   Psychiatric/Behavioral: Negative for dysphoric mood. The patient is not nervous/anxious.    All other systems reviewed and are negative.      /76   Ht 170.2 cm (67\")   Wt 61.7 kg (136 lb)   BMI 21.30 kg/m²     Physical Exam "   Constitutional: She is oriented to person, place, and time. She appears well-developed and well-nourished.   HENT:   Head: Normocephalic and atraumatic.   Eyes: Conjunctivae are normal. No scleral icterus.   Abdominal: Soft. She exhibits no distension and no mass. There is no tenderness. There is no rebound and no guarding. No hernia.   Neurological: She is alert and oriented to person, place, and time.   Skin: Skin is warm and dry.   Psychiatric: She has a normal mood and affect. Her behavior is normal. Judgment and thought content normal.   Nursing note and vitals reviewed.      A/P:  1. Pelvic pain- normal pelvic US.   2. Alternating constipation and diarrhea- refer to GI  3. CS- IUD in place  4. RHM- RTO 2 months annual or prn.     Assessment/Plan   Daija was seen today for pelvic pain.    Diagnoses and all orders for this visit:    Screening for condition  -     POC Urinalysis Dipstick  -     POC Pregnancy, Urine    LLQ pain  -     Ambulatory Referral to Gastroenterology    Constipation, unspecified constipation type  -     Ambulatory Referral to Gastroenterology                 No follow-ups on file.      Mariela Royal MD    6/24/2020  17:34

## 2020-06-26 ENCOUNTER — OFFICE (OUTPATIENT)
Dept: URBAN - METROPOLITAN AREA CLINIC 65 | Facility: CLINIC | Age: 30
End: 2020-06-26

## 2020-06-26 VITALS
SYSTOLIC BLOOD PRESSURE: 110 MMHG | TEMPERATURE: 97 F | DIASTOLIC BLOOD PRESSURE: 63 MMHG | HEART RATE: 65 BPM | HEIGHT: 67 IN | WEIGHT: 135 LBS

## 2020-06-26 DIAGNOSIS — M79.89 OTHER SPECIFIED SOFT TISSUE DISORDERS: ICD-10-CM

## 2020-06-26 DIAGNOSIS — A04.9 BACTERIAL INTESTINAL INFECTION, UNSPECIFIED: ICD-10-CM

## 2020-06-26 PROCEDURE — 99202 OFFICE O/P NEW SF 15 MIN: CPT | Performed by: INTERNAL MEDICINE

## 2020-06-26 RX ORDER — CIPROFLOXACIN 500 MG/1
1000 TABLET, FILM COATED ORAL
Qty: 28 | Refills: 0 | Status: ACTIVE
Start: 2020-06-26

## 2020-09-11 ENCOUNTER — TELEPHONE (OUTPATIENT)
Dept: OBSTETRICS AND GYNECOLOGY | Facility: CLINIC | Age: 30
End: 2020-09-11

## 2020-09-11 RX ORDER — FLUCONAZOLE 150 MG/1
150 TABLET ORAL ONCE
Qty: 1 TABLET | Refills: 1 | Status: SHIPPED | OUTPATIENT
Start: 2020-09-11 | End: 2020-09-11

## 2020-11-13 RX ORDER — VALACYCLOVIR HYDROCHLORIDE 500 MG/1
500 TABLET, FILM COATED ORAL DAILY
Qty: 30 TABLET | Refills: 2 | Status: SHIPPED | OUTPATIENT
Start: 2020-11-13 | End: 2021-02-14

## 2020-12-01 ENCOUNTER — TELEPHONE (OUTPATIENT)
Dept: OBSTETRICS AND GYNECOLOGY | Facility: CLINIC | Age: 30
End: 2020-12-01

## 2020-12-01 RX ORDER — CLINDAMYCIN PHOSPHATE 20 MG/G
1 CREAM VAGINAL NIGHTLY
Qty: 40 G | Refills: 0 | Status: SHIPPED | OUTPATIENT
Start: 2020-12-01 | End: 2020-12-08

## 2020-12-03 NOTE — TELEPHONE ENCOUNTER
Pt called and stated that her pharmacy was out of the cream and so she actually wanted to know if there was another pill that she could take as she prefers pill form.  She is going out of town today at 2 pm.

## 2020-12-03 NOTE — TELEPHONE ENCOUNTER
She said that she cannot take oral Flagyl, which is the mainstay of treatment.  There is an oral antibiotic called Tindamax, which is very similar to Flagyl, or we can try vaginal metronidazole.  Oral clindamycin is not reliable at treating bacterial vaginosis.  Just let me know and I will call it in

## 2020-12-04 ENCOUNTER — TELEPHONE (OUTPATIENT)
Dept: OBSTETRICS AND GYNECOLOGY | Facility: CLINIC | Age: 30
End: 2020-12-04

## 2020-12-04 NOTE — TELEPHONE ENCOUNTER
Patient calling states the clindamycin cream you called in for isn't working and would like to know if you will call it in, in pill form?

## 2020-12-07 NOTE — TELEPHONE ENCOUNTER
Clindamycin oral is not an effective treatment for BV, as I wrote last week. Her options for BV are:  Oral metronidazole  Vaginal metronidazole  Vaginal clindamycin  Oral tindamax ( a relative of metronidazole)  Or, she can come in for a swab so we know what we are treating ( which may be best, since she is guessing what she has, so we are guessing about what treatment will work)

## 2020-12-09 ENCOUNTER — OFFICE VISIT (OUTPATIENT)
Dept: OBSTETRICS AND GYNECOLOGY | Facility: CLINIC | Age: 30
End: 2020-12-09

## 2020-12-09 VITALS
SYSTOLIC BLOOD PRESSURE: 124 MMHG | HEIGHT: 67 IN | BODY MASS INDEX: 21.82 KG/M2 | DIASTOLIC BLOOD PRESSURE: 82 MMHG | WEIGHT: 139 LBS

## 2020-12-09 DIAGNOSIS — Z13.9 SCREENING FOR CONDITION: Primary | ICD-10-CM

## 2020-12-09 DIAGNOSIS — N76.1 SUBACUTE VAGINITIS: ICD-10-CM

## 2020-12-09 LAB
B-HCG UR QL: NEGATIVE
BILIRUB BLD-MCNC: NEGATIVE MG/DL
CLARITY, POC: CLEAR
COLOR UR: YELLOW
GLUCOSE UR STRIP-MCNC: NEGATIVE MG/DL
INTERNAL NEGATIVE CONTROL: NEGATIVE
INTERNAL POSITIVE CONTROL: POSITIVE
KETONES UR QL: NEGATIVE
LEUKOCYTE EST, POC: NEGATIVE
Lab: 55
NITRITE UR-MCNC: NEGATIVE MG/ML
PH UR: 5 [PH] (ref 5–8)
PROT UR STRIP-MCNC: NEGATIVE MG/DL
RBC # UR STRIP: ABNORMAL /UL
SP GR UR: 1 (ref 1–1.03)
UROBILINOGEN UR QL: NORMAL

## 2020-12-09 PROCEDURE — 81002 URINALYSIS NONAUTO W/O SCOPE: CPT | Performed by: OBSTETRICS & GYNECOLOGY

## 2020-12-09 PROCEDURE — 81025 URINE PREGNANCY TEST: CPT | Performed by: OBSTETRICS & GYNECOLOGY

## 2020-12-09 PROCEDURE — 99213 OFFICE O/P EST LOW 20 MIN: CPT | Performed by: OBSTETRICS & GYNECOLOGY

## 2020-12-09 NOTE — PROGRESS NOTES
"Daija Dean is a 30 y.o. patient who presents for follow up of   Chief Complaint   Patient presents with   • Follow-up     BV     31 yo est pt here for vaginal irritation. She has had several months of vaginal irritation and itching. She has noticed some discharge that is non specific. She has treated it as BV without benefit. She denies any pain with sex or vulvar lesions.         The following portions of the patient's history were reviewed and updated as appropriate: allergies, current medications and problem list.    Review of Systems   Constitutional: Positive for activity change and unexpected weight change. Negative for appetite change, fatigue and fever.   Eyes: Negative for photophobia and visual disturbance.   Respiratory: Negative for cough and shortness of breath.    Cardiovascular: Negative for chest pain and palpitations.   Gastrointestinal: Negative for abdominal distention, abdominal pain, constipation, diarrhea and nausea.   Endocrine: Negative for cold intolerance and heat intolerance.   Genitourinary: Positive for vaginal discharge and vaginal pain (irritation). Negative for dyspareunia, dysuria, menstrual problem and pelvic pain.   Musculoskeletal: Negative for back pain.   Skin: Negative for color change and rash.   Neurological: Negative for headaches.   Hematological: Negative for adenopathy. Does not bruise/bleed easily.   Psychiatric/Behavioral: Negative for dysphoric mood. The patient is not nervous/anxious.        /82   Ht 170.2 cm (67.01\")   Wt 63 kg (139 lb)   BMI 21.77 kg/m²     Physical Exam  Vitals signs and nursing note reviewed. Exam conducted with a chaperone present.   Constitutional:       Appearance: Normal appearance. She is well-developed.   HENT:      Head: Normocephalic and atraumatic.   Eyes:      General: No scleral icterus.     Conjunctiva/sclera: Conjunctivae normal.   Neck:      Thyroid: No thyromegaly.   Abdominal:      General: There is no " distension.      Palpations: Abdomen is soft. There is no mass.      Tenderness: There is no abdominal tenderness. There is no guarding or rebound.      Hernia: No hernia is present.   Genitourinary:     General: Normal vulva.      Vagina: No signs of injury and foreign body. Vaginal discharge present. No erythema, tenderness, bleeding, lesions or prolapsed vaginal walls.      Cervix: Normal.      Uterus: Normal.       Adnexa: Right adnexa normal and left adnexa normal.      Comments: Scant, non specific discharge in vault  IUD string not seen  Skin:     General: Skin is warm and dry.   Neurological:      Mental Status: She is alert and oriented to person, place, and time.   Psychiatric:         Mood and Affect: Mood normal.         Behavior: Behavior normal.         Thought Content: Thought content normal.         Judgment: Judgment normal.         A/P:  1.Vaginitis- check NuSwab Y/M/L/B. Will treat according to results as empiric therapy has not worked.  2. [facility]- enc pt to schedule annual     Assessment/Plan   Diagnoses and all orders for this visit:    1. Screening for condition (Primary)  -     POC Urinalysis Dipstick  -     POC Pregnancy, Urine  -     NuSwab VG+ - Swab, Vagina  -     Genital Mycoplasmas KARMA, Swab - Swab, Vagina    2. Subacute vaginitis                 No follow-ups on file.      Mariela Royal MD    12/9/2020  19:24 EST

## 2020-12-16 LAB
A VAGINAE DNA VAG QL NAA+PROBE: ABNORMAL SCORE
BVAB2 DNA VAG QL NAA+PROBE: ABNORMAL SCORE
C ALBICANS DNA VAG QL NAA+PROBE: POSITIVE
C GLABRATA DNA VAG QL NAA+PROBE: NEGATIVE
C TRACH DNA VAG QL NAA+PROBE: NEGATIVE
M GENITALIUM DNA SPEC QL NAA+PROBE: NEGATIVE
M HOMINIS DNA SPEC QL NAA+PROBE: NEGATIVE
MEGA1 DNA VAG QL NAA+PROBE: ABNORMAL SCORE
N GONORRHOEA DNA VAG QL NAA+PROBE: NEGATIVE
T VAGINALIS DNA VAG QL NAA+PROBE: NEGATIVE
UREAPLASMA DNA SPEC QL NAA+PROBE: POSITIVE

## 2020-12-16 RX ORDER — FLUCONAZOLE 150 MG/1
150 TABLET ORAL ONCE
Qty: 1 TABLET | Refills: 1 | Status: SHIPPED | OUTPATIENT
Start: 2020-12-16 | End: 2020-12-16

## 2020-12-16 RX ORDER — AZITHROMYCIN 250 MG/1
TABLET, FILM COATED ORAL
Qty: 6 TABLET | Refills: 0 | Status: SHIPPED | OUTPATIENT
Start: 2020-12-16 | End: 2021-02-23

## 2020-12-29 ENCOUNTER — TELEPHONE (OUTPATIENT)
Dept: OBSTETRICS AND GYNECOLOGY | Facility: CLINIC | Age: 30
End: 2020-12-29

## 2020-12-29 RX ORDER — DOXYCYCLINE HYCLATE 100 MG
100 TABLET ORAL 2 TIMES DAILY
Qty: 14 TABLET | Refills: 0 | Status: SHIPPED | OUTPATIENT
Start: 2020-12-29 | End: 2021-01-05

## 2020-12-29 RX ORDER — FLUCONAZOLE 150 MG/1
150 TABLET ORAL ONCE
Qty: 1 TABLET | Refills: 1 | Status: SHIPPED | OUTPATIENT
Start: 2020-12-29 | End: 2020-12-29

## 2020-12-29 NOTE — TELEPHONE ENCOUNTER
I called in doxycycline and Diflucan for her. If this is not better in 10-14 days, then she needs another swab. AKR

## 2020-12-29 NOTE — TELEPHONE ENCOUNTER
Patient called stating she has taken the medication prescribed for bv and uti and she is still having symptoms and would like to know if you can call in another round of medication or if she should try something else?

## 2021-01-05 ENCOUNTER — IMMUNIZATION (OUTPATIENT)
Dept: VACCINE CLINIC | Facility: HOSPITAL | Age: 31
End: 2021-01-05

## 2021-01-05 PROCEDURE — 0001A: CPT | Performed by: INTERNAL MEDICINE

## 2021-01-05 PROCEDURE — 91300 HC SARSCOV02 VAC 30MCG/0.3ML IM: CPT | Performed by: INTERNAL MEDICINE

## 2021-01-26 ENCOUNTER — IMMUNIZATION (OUTPATIENT)
Dept: VACCINE CLINIC | Facility: HOSPITAL | Age: 31
End: 2021-01-26

## 2021-01-26 PROCEDURE — 0002A: CPT | Performed by: INTERNAL MEDICINE

## 2021-01-26 PROCEDURE — 91300 HC SARSCOV02 VAC 30MCG/0.3ML IM: CPT | Performed by: INTERNAL MEDICINE

## 2021-02-14 RX ORDER — VALACYCLOVIR HYDROCHLORIDE 500 MG/1
TABLET, FILM COATED ORAL
Qty: 90 TABLET | Refills: 0 | Status: SHIPPED | OUTPATIENT
Start: 2021-02-14 | End: 2022-02-22 | Stop reason: SDUPTHER

## 2021-02-23 ENCOUNTER — OFFICE VISIT (OUTPATIENT)
Dept: FAMILY MEDICINE CLINIC | Facility: CLINIC | Age: 31
End: 2021-02-23

## 2021-02-23 VITALS
BODY MASS INDEX: 22.91 KG/M2 | HEART RATE: 68 BPM | DIASTOLIC BLOOD PRESSURE: 68 MMHG | OXYGEN SATURATION: 100 % | HEIGHT: 67 IN | SYSTOLIC BLOOD PRESSURE: 122 MMHG | WEIGHT: 146 LBS | RESPIRATION RATE: 14 BRPM

## 2021-02-23 DIAGNOSIS — R53.83 FATIGUE, UNSPECIFIED TYPE: ICD-10-CM

## 2021-02-23 DIAGNOSIS — G43.109 MIGRAINE WITH AURA AND WITHOUT STATUS MIGRAINOSUS, NOT INTRACTABLE: ICD-10-CM

## 2021-02-23 DIAGNOSIS — F41.9 ANXIETY AND DEPRESSION: ICD-10-CM

## 2021-02-23 DIAGNOSIS — Z00.00 PHYSICAL EXAM: Primary | ICD-10-CM

## 2021-02-23 DIAGNOSIS — F32.A ANXIETY AND DEPRESSION: ICD-10-CM

## 2021-02-23 LAB
BASOPHILS # BLD AUTO: 0.05 10*3/MM3 (ref 0–0.2)
BASOPHILS NFR BLD AUTO: 0.8 % (ref 0–1.5)
EOSINOPHIL # BLD AUTO: 0.13 10*3/MM3 (ref 0–0.4)
EOSINOPHIL NFR BLD AUTO: 2.1 % (ref 0.3–6.2)
ERYTHROCYTE [DISTWIDTH] IN BLOOD BY AUTOMATED COUNT: 11.6 % (ref 12.3–15.4)
HCT VFR BLD AUTO: 40.5 % (ref 34–46.6)
HGB BLD-MCNC: 13.6 G/DL (ref 12–15.9)
IMM GRANULOCYTES # BLD AUTO: 0.02 10*3/MM3 (ref 0–0.05)
IMM GRANULOCYTES NFR BLD AUTO: 0.3 % (ref 0–0.5)
LYMPHOCYTES # BLD AUTO: 1.47 10*3/MM3 (ref 0.7–3.1)
LYMPHOCYTES NFR BLD AUTO: 24.2 % (ref 19.6–45.3)
MCH RBC QN AUTO: 32 PG (ref 26.6–33)
MCHC RBC AUTO-ENTMCNC: 33.6 G/DL (ref 31.5–35.7)
MCV RBC AUTO: 95.3 FL (ref 79–97)
MONOCYTES # BLD AUTO: 0.52 10*3/MM3 (ref 0.1–0.9)
MONOCYTES NFR BLD AUTO: 8.6 % (ref 5–12)
NEUTROPHILS # BLD AUTO: 3.89 10*3/MM3 (ref 1.7–7)
NEUTROPHILS NFR BLD AUTO: 64 % (ref 42.7–76)
NRBC BLD AUTO-RTO: 0 /100 WBC (ref 0–0.2)
PLATELET # BLD AUTO: 305 10*3/MM3 (ref 140–450)
RBC # BLD AUTO: 4.25 10*6/MM3 (ref 3.77–5.28)
RETICS/RBC NFR AUTO: 1.4 % (ref 0.7–1.9)
WBC # BLD AUTO: 6.08 10*3/MM3 (ref 3.4–10.8)

## 2021-02-23 PROCEDURE — 99395 PREV VISIT EST AGE 18-39: CPT | Performed by: NURSE PRACTITIONER

## 2021-02-23 RX ORDER — MAGNESIUM OXIDE 400 MG/1
400 TABLET ORAL DAILY
Qty: 90 TABLET | Refills: 1 | Status: SHIPPED | OUTPATIENT
Start: 2021-02-23 | End: 2022-06-22

## 2021-02-23 NOTE — PROGRESS NOTES
Subjective   Daija Dean is a 30 y.o. female.   PMHX:migraine headaches, seasonal allergies, HSV 2  PSHX: Reviewed in chart and with patient there is nothing new.  PFHX: No hypertension cardiovascular disease or hyperlipidemia.  Exercise: Daily exercise  Diet:well balanced  Sleep hygiene:excessive sleep and is concerned  Employment status:is a  works at [employer]  She reports that she has had quite a bit of fatigue over the last 2 months and is concerned that there is an issue.    History of Present Illness     The following portions of the patient's history were reviewed and updated as appropriate: allergies, current medications, past family history, past medical history, past social history, past surgical history and problem list.    Review of Systems   Constitutional: Positive for fatigue. Negative for activity change, appetite change, chills and fever.   HENT: Negative for congestion.    Eyes: Negative for pain.   Respiratory: Negative for cough and shortness of breath.    Cardiovascular: Negative for chest pain and leg swelling.   Gastrointestinal: Negative for nausea and vomiting.   Genitourinary: Negative for difficulty urinating.   Skin: Negative for rash.   Neurological: Positive for headache. Negative for dizziness and facial asymmetry.   Psychiatric/Behavioral: The patient is nervous/anxious.        Objective   Physical Exam  Vitals signs and nursing note reviewed.   Constitutional:       General: She is not in acute distress.     Appearance: She is well-developed.   HENT:      Head: Normocephalic and atraumatic.      Right Ear: External ear normal.      Left Ear: External ear normal.   Neck:      Musculoskeletal: Neck supple.      Thyroid: No thyromegaly.   Cardiovascular:      Rate and Rhythm: Normal rate and regular rhythm.      Heart sounds: Normal heart sounds.   Pulmonary:      Effort: Pulmonary effort is normal.      Breath sounds: Normal breath sounds.   Musculoskeletal: Normal range of  motion.   Skin:     General: Skin is warm.   Neurological:      Mental Status: She is alert.   Psychiatric:         Attention and Perception: Attention normal.         Mood and Affect: Mood is depressed.         Behavior: Behavior is cooperative.         Thought Content: Thought content does not include homicidal or suicidal ideation. Thought content does not include homicidal or suicidal plan.           Assessment/Plan   Diagnoses and all orders for this visit:    1. Physical exam (Primary)    2. Fatigue, unspecified type  -     Iron  -     Vitamin B12 and Folate  -     Ferritin  -     CBC and Differential  -     Reticulocytes  -     TSH    3. Anxiety and depression    4. Migraine with aura and without status migrainosus, not intractable    Other orders  -     magnesium oxide (MAG-OX) 400 MG tablet; Take 1 tablet by mouth Daily.  Dispense: 90 tablet; Refill: 1    We will call with lab results.  She will continue her current medications as discussed.  Preventative counseling for diet and exercise with patient at visit.  Pt reports that they wear their seatbelt regularly.   If her labs are within normal limits I think we will wean her off her sertraline over a months time and then start her on Prozac.  Patient is in agreement with the plan.

## 2021-02-23 NOTE — PROGRESS NOTES
Subjective   Daija Dean is a 30 y.o. female.   Annual Exam and Depression    History of Present Illness     The following portions of the patient's history were reviewed and updated as appropriate: allergies, current medications, past family history, past medical history, past social history, past surgical history and problem list.    Review of Systems    Objective   Physical Exam      Assessment/Plan   Problem List Items Addressed This Visit     None               No follow-ups on file.

## 2021-02-23 NOTE — PATIENT INSTRUCTIONS
Preventive Care 21-39 Years Old, Female  Preventive care refers to visits with your health care provider and lifestyle choices that can promote health and wellness. This includes:  · A yearly physical exam. This may also be called an annual well check.  · Regular dental visits and eye exams.  · Immunizations.  · Screening for certain conditions.  · Healthy lifestyle choices, such as eating a healthy diet, getting regular exercise, not using drugs or products that contain nicotine and tobacco, and limiting alcohol use.  What can I expect for my preventive care visit?  Physical exam  Your health care provider will check your:  · Height and weight. This may be used to calculate body mass index (BMI), which tells if you are at a healthy weight.  · Heart rate and blood pressure.  · Skin for abnormal spots.  Counseling  Your health care provider may ask you questions about your:  · Alcohol, tobacco, and drug use.  · Emotional well-being.  · Home and relationship well-being.  · Sexual activity.  · Eating habits.  · Work and work environment.  · Method of birth control.  · Menstrual cycle.  · Pregnancy history.  What immunizations do I need?    Influenza (flu) vaccine  · This is recommended every year.  Tetanus, diphtheria, and pertussis (Tdap) vaccine  · You may need a Td booster every 10 years.  Varicella (chickenpox) vaccine  · You may need this if you have not been vaccinated.  Human papillomavirus (HPV) vaccine  · If recommended by your health care provider, you may need three doses over 6 months.  Measles, mumps, and rubella (MMR) vaccine  · You may need at least one dose of MMR. You may also need a second dose.  Meningococcal conjugate (MenACWY) vaccine  · One dose is recommended if you are age 19-21 years and a first-year college student living in a residence red, or if you have one of several medical conditions. You may also need additional booster doses.  Pneumococcal conjugate (PCV13) vaccine  · You may need  this if you have certain conditions and were not previously vaccinated.  Pneumococcal polysaccharide (PPSV23) vaccine  · You may need one or two doses if you smoke cigarettes or if you have certain conditions.  Hepatitis A vaccine  · You may need this if you have certain conditions or if you travel or work in places where you may be exposed to hepatitis A.  Hepatitis B vaccine  · You may need this if you have certain conditions or if you travel or work in places where you may be exposed to hepatitis B.  Haemophilus influenzae type b (Hib) vaccine  · You may need this if you have certain conditions.  You may receive vaccines as individual doses or as more than one vaccine together in one shot (combination vaccines). Talk with your health care provider about the risks and benefits of combination vaccines.  What tests do I need?    Blood tests  · Lipid and cholesterol levels. These may be checked every 5 years starting at age 20.  · Hepatitis C test.  · Hepatitis B test.  Screening  · Diabetes screening. This is done by checking your blood sugar (glucose) after you have not eaten for a while (fasting).  · Sexually transmitted disease (STD) testing.  · BRCA-related cancer screening. This may be done if you have a family history of breast, ovarian, tubal, or peritoneal cancers.  · Pelvic exam and Pap test. This may be done every 3 years starting at age 21. Starting at age 30, this may be done every 5 years if you have a Pap test in combination with an HPV test.  Talk with your health care provider about your test results, treatment options, and if necessary, the need for more tests.  Follow these instructions at home:  Eating and drinking    · Eat a diet that includes fresh fruits and vegetables, whole grains, lean protein, and low-fat dairy.  · Take vitamin and mineral supplements as recommended by your health care provider.  · Do not drink alcohol if:  ? Your health care provider tells you not to drink.  ? You are  pregnant, may be pregnant, or are planning to become pregnant.  · If you drink alcohol:  ? Limit how much you have to 0-1 drink a day.  ? Be aware of how much alcohol is in your drink. In the [place] one drink equals one 12 oz bottle of beer (355 mL), one 5 oz glass of wine (148 mL), or one 1½ oz glass of hard liquor (44 mL).  Lifestyle  · Take daily care of your teeth and gums.  · Stay active. Exercise for at least 30 minutes on 5 or more days each week.  · Do not use any products that contain nicotine or tobacco, such as cigarettes, e-cigarettes, and chewing tobacco. If you need help quitting, ask your health care provider.  · If you are sexually active, practice safe sex. Use a condom or other form of birth control (contraception) in order to prevent pregnancy and STIs (sexually transmitted infections). If you plan to become pregnant, see your health care provider for a preconception visit.  What's next?  · Visit your health care provider once a year for a well check visit.  · Ask your health care provider how often you should have your eyes and teeth checked.  · Stay up to date on all vaccines.  This information is not intended to replace advice given to you by your health care provider. Make sure you discuss any questions you have with your health care provider.  Document Revised: 08/29/2019 Document Reviewed: 08/29/2019  [employer] Patient Education © 2020 [employer]

## 2021-02-24 LAB
FERRITIN SERPL-MCNC: 43.4 NG/ML (ref 13–150)
FOLATE SERPL-MCNC: 10.7 NG/ML (ref 4.78–24.2)
IRON SERPL-MCNC: 241 MCG/DL (ref 37–145)
TSH SERPL DL<=0.005 MIU/L-ACNC: 0.94 UIU/ML (ref 0.27–4.2)
VIT B12 SERPL-MCNC: 413 PG/ML (ref 211–946)

## 2021-04-07 ENCOUNTER — OFFICE VISIT (OUTPATIENT)
Dept: OBSTETRICS AND GYNECOLOGY | Facility: CLINIC | Age: 31
End: 2021-04-07

## 2021-04-07 VITALS
HEIGHT: 67 IN | BODY MASS INDEX: 22.29 KG/M2 | SYSTOLIC BLOOD PRESSURE: 112 MMHG | DIASTOLIC BLOOD PRESSURE: 72 MMHG | WEIGHT: 142 LBS

## 2021-04-07 DIAGNOSIS — Z01.419 PAP SMEAR, LOW-RISK: Primary | ICD-10-CM

## 2021-04-07 DIAGNOSIS — Z11.3 SCREEN FOR STD (SEXUALLY TRANSMITTED DISEASE): ICD-10-CM

## 2021-04-07 DIAGNOSIS — Z13.9 SCREENING FOR CONDITION: ICD-10-CM

## 2021-04-07 DIAGNOSIS — Z01.419 ENCOUNTER FOR GYNECOLOGICAL EXAMINATION WITHOUT ABNORMAL FINDING: ICD-10-CM

## 2021-04-07 DIAGNOSIS — Z11.51 ENCOUNTER FOR SCREENING FOR HUMAN PAPILLOMAVIRUS (HPV): ICD-10-CM

## 2021-04-07 LAB
B-HCG UR QL: NEGATIVE
BILIRUB BLD-MCNC: NEGATIVE MG/DL
CLARITY, POC: CLEAR
COLOR UR: YELLOW
GLUCOSE UR STRIP-MCNC: NEGATIVE MG/DL
INTERNAL NEGATIVE CONTROL: NEGATIVE
INTERNAL POSITIVE CONTROL: POSITIVE
KETONES UR QL: NEGATIVE
LEUKOCYTE EST, POC: NEGATIVE
Lab: 55
NITRITE UR-MCNC: NEGATIVE MG/ML
PH UR: 5 [PH] (ref 5–8)
PROT UR STRIP-MCNC: NEGATIVE MG/DL
RBC # UR STRIP: NEGATIVE /UL
SP GR UR: 1 (ref 1–1.03)
UROBILINOGEN UR QL: NORMAL

## 2021-04-07 PROCEDURE — 99395 PREV VISIT EST AGE 18-39: CPT | Performed by: OBSTETRICS & GYNECOLOGY

## 2021-04-07 PROCEDURE — 81025 URINE PREGNANCY TEST: CPT | Performed by: OBSTETRICS & GYNECOLOGY

## 2021-04-07 PROCEDURE — 81002 URINALYSIS NONAUTO W/O SCOPE: CPT | Performed by: OBSTETRICS & GYNECOLOGY

## 2021-04-07 NOTE — PROGRESS NOTES
GYN Annual Exam     CC- Here for annual exam.     Daija Dean is a 30 y.o. female established patient who presents for annual well woman exam. No cycles with Mirena, which was replaced in [place] in 3/2018. She has been working from home during the pandemic.     OB History        0    Para   0    Term   0       0    AB   0    Living   0       SAB   0    TAB   0    Ectopic   0    Molar   0    Multiple   0    Live Births   0          Obstetric Comments   No plans             Menarche: 11  Current contraception: IUD- mirena 3/2018  History of abnormal Pap smear: yes - 1 abnormal with normal f/u  History of abnormal mammogram: no  Family history of uterine, colon or ovarian cancer: yes - M great aunt ovarian , pt is BRCA neg  Family history of breast cancer: yes - 2 paternal great aunts> 50  H/o STDs: h/o genital warts  Gardasil: 3/3  DAWNA: none  Last pap: 3/2019- nl pap/HPV    Health Maintenance   Topic Date Due   • Pneumococcal Vaccine 0-64 (1 of 1 - PPSV23) Never done   • Annual Gynecologic Pelvic and Breast Exam  2020   • INFLUENZA VACCINE  2021   • ANNUAL PHYSICAL  2022   • PAP SMEAR  2024   • TDAP/TD VACCINES (2 - Td) 2029   • HEPATITIS C SCREENING  Completed   • COVID-19 Vaccine  Completed       Past Medical History:   Diagnosis Date   • Abnormal Pap smear of cervix     h/o abnormal w/ nl f/u   • Anxiety and depression 3/20/2019   • Depression    • Headache    • History of genital warts     s/p TCA   • Migraine     migraines with aura   • Migraine with aura 3/20/2019   • Ovarian cyst 2018    h/o L ovarian cystectomy       Past Surgical History:   Procedure Laterality Date   • AUGMENTATION MAMMAPLASTY     • BREAST SURGERY      saline implants   • DIAGNOSTIC LAPAROSCOPY Left 2018    Procedure: DIAGNOSTIC LAPAROSCOPY with left ovarian cystectomy;  Surgeon: Brittani Gregg DO;  [facility];  Service: Obstetrics/Gynecology   • WISDOM TOOTH EXTRACTION            Current Outpatient Medications:   •  ACZONE 7.5 % gel, , Disp: , Rfl:   •  albuterol sulfate  (90 Base) MCG/ACT inhaler, Inhale 2 puffs Every 4 (Four) Hours As Needed for Wheezing., Disp: 18 g, Rfl: 0  •  cetirizine (zyrTEC) 10 MG tablet, Take 10 mg by mouth Daily., Disp: , Rfl:   •  levonorgestrel (MIRENA) 20 MCG/24HR IUD, 1 each by Intrauterine route., Disp: , Rfl:   •  magnesium oxide (MAG-OX) 400 MG tablet, Take 1 tablet by mouth Daily., Disp: 90 tablet, Rfl: 1  •  sertraline (ZOLOFT) 50 MG tablet, TAKE 1 TABLET BY MOUTH EVERY DAY, Disp: 90 tablet, Rfl: 0  •  topiramate (TOPAMAX) 50 MG tablet, , Disp: , Rfl:   •  valACYclovir (VALTREX) 500 MG tablet, TAKE 1 TABLET BY MOUTH EVERY DAY, Disp: 90 tablet, Rfl: 0    Allergies   Allergen Reactions   • Decadron [Dexamethasone] Itching       Social History     Tobacco Use   • Smoking status: Never Smoker   • Smokeless tobacco: Never Used   Substance Use Topics   • Alcohol use: Yes     Comment: social   • Drug use: No       Family History   Problem Relation Age of Onset   • No Known Problems Father    • No Known Problems Mother    • Ovarian cancer Maternal Aunt 63        great mat aunt , pt is BRCA neg   • Breast cancer Paternal Aunt         great pat aunt    • Cancer Paternal Grandfather         bladder ca   • Breast cancer Paternal Aunt         great pat aunt   • Colon cancer Neg Hx    • Deep vein thrombosis Neg Hx    • Prostate cancer Neg Hx        Review of Systems   Constitutional: Positive for activity change. Negative for appetite change, fatigue, fever and unexpected weight change.   Respiratory: Negative for cough and shortness of breath.    Cardiovascular: Negative for chest pain and palpitations.   Gastrointestinal: Negative for abdominal distention, abdominal pain, constipation, diarrhea and nausea.   Endocrine: Negative for cold intolerance.   Genitourinary: Negative for dyspareunia, dysuria, menstrual problem, pelvic pain, vaginal bleeding and  "vaginal discharge.   Skin: Negative for color change and rash.   Psychiatric/Behavioral: Negative for dysphoric mood. The patient is not nervous/anxious.    All other systems reviewed and are negative.      /72   Ht 170.2 cm (67.01\")   Wt 64.4 kg (142 lb)   Breastfeeding No   BMI 22.24 kg/m²     Physical Exam   Constitutional: She is oriented to person, place, and time. She appears well-developed.   HENT:   Head: Normocephalic and atraumatic.   Eyes: Conjunctivae are normal. No scleral icterus.   Neck: No thyromegaly present.   Cardiovascular: Normal rate and regular rhythm.   Pulmonary/Chest: Effort normal and breath sounds normal. Right breast exhibits no inverted nipple, no mass, no nipple discharge, no skin change and no tenderness. Left breast exhibits no inverted nipple, no mass, no nipple discharge, no skin change and no tenderness.   B implants noted   Abdominal: Soft. Normal appearance and bowel sounds are normal. She exhibits no distension and no mass. There is no abdominal tenderness. There is no rebound and no guarding. No hernia.   Genitourinary:    Pelvic exam was performed with patient supine.   There is no rash, tenderness, lesion or injury on the right labia. There is no rash, tenderness, lesion or injury on the left labia. Uterus is not deviated, not enlarged, not fixed and not tender. Cervix exhibits no motion tenderness, no lesion, no discharge and no friability. Right adnexum displays no mass, no tenderness and no fullness. Left adnexum displays no mass, no tenderness and no fullness.    No vaginal discharge, erythema, tenderness or bleeding.   No erythema, tenderness or bleeding in the vagina.    No foreign body in the vagina.      No signs of injury in the vagina.      Genitourinary Comments: IUD string not seen     Neurological: She is alert and oriented to person, place, and time.   Skin: Skin is warm and dry.   Psychiatric: Her behavior is normal. Mood, judgment and thought " content normal.   Nursing note and vitals reviewed.         Assessment/Plan    1) GYN HM: check pap/HPV   SBE demonstrated and encouraged.  2) STD screening: accepts. Condoms encouraged.  3) Contraception: Mirena IUD placed 3/2018 in [place], string not seen. Was seen by US in 6/2020.   4) Family Planning: no plans at present, encourage folic acid daily  5) Diet and Exercise discussed  6) Smoking Status: non smoker  7) Social: , works from home  8) MMG-  Plan age 38   9)Parts of this document have been copied or forwarded from her previous visits and have been reviewed, updated and edited as indicated.   10)I saw the patient with a face mask, gloves and eye protection  The patient herself was masked.  Social distancing was observed as appropriate.  11)Follow up prn or 1 year      Diagnoses and all orders for this visit:    1. Pap smear, low-risk (Primary)  -     Cancel: IgP, Aptima HPV  -     IGP,CtNgTv,Apt HPV,rfx 16 / 18,45    2. Screening for condition  -     POC Pregnancy, Urine  -     POC Urinalysis Dipstick  -     Hepatitis B Surface Antigen  -     Hepatitis C Antibody  -     HIV-1 / O / 2 Ag / Antibody 4th Generation  -     HSV 1 & 2 - Specific Antibody, IgG  -     RPR, Rfx Qn RPR / Confirm TP    3. Encounter for screening for human papillomavirus (HPV)  -     Cancel: IgP, Aptima HPV  -     IGP,CtNgTv,Apt HPV,rfx 16 / 18,45    4. Encounter for gynecological examination without abnormal finding    5. Screen for STD (sexually transmitted disease)          Mariela Royal MD  4/7/2021  08:53 EDT

## 2021-04-08 LAB
HBV SURFACE AG SERPL QL IA: NEGATIVE
HCV AB S/CO SERPL IA: <0.1 S/CO RATIO (ref 0–0.9)
HIV 1+2 AB+HIV1 P24 AG SERPL QL IA: NON REACTIVE
HSV1 IGG SER IA-ACNC: 41.6 INDEX (ref 0–0.9)
HSV2 IGG SER IA-ACNC: <0.91 INDEX (ref 0–0.9)
RPR SER QL: NON REACTIVE

## 2021-04-12 LAB
C TRACH RRNA CVX QL NAA+PROBE: NEGATIVE
CYTOLOGIST CVX/VAG CYTO: ABNORMAL
CYTOLOGY CVX/VAG DOC CYTO: ABNORMAL
CYTOLOGY CVX/VAG DOC THIN PREP: ABNORMAL
DX ICD CODE: ABNORMAL
HIV 1 & 2 AB SER-IMP: ABNORMAL
HPV I/H RISK 4 DNA CVX QL PROBE+SIG AMP: POSITIVE
HPV16 DNA CVX QL PROBE+SIG AMP: NEGATIVE
HPV18+45 E6+E7 MRNA CVX QL NAA+PROBE: NEGATIVE
N GONORRHOEA RRNA CVX QL NAA+PROBE: NEGATIVE
OTHER STN SPEC: ABNORMAL
STAT OF ADQ CVX/VAG CYTO-IMP: ABNORMAL
T VAGINALIS RRNA SPEC QL NAA+PROBE: NEGATIVE

## 2021-05-12 ENCOUNTER — OFFICE VISIT (OUTPATIENT)
Dept: OBSTETRICS AND GYNECOLOGY | Facility: CLINIC | Age: 31
End: 2021-05-12

## 2021-05-12 VITALS
SYSTOLIC BLOOD PRESSURE: 108 MMHG | WEIGHT: 145.8 LBS | BODY MASS INDEX: 22.88 KG/M2 | DIASTOLIC BLOOD PRESSURE: 78 MMHG | HEIGHT: 67 IN

## 2021-05-12 DIAGNOSIS — R87.618 PAP SMEAR ABNORMALITY OF CERVIX/HUMAN PAPILLOMAVIRUS (HPV) POSITIVE: Primary | ICD-10-CM

## 2021-05-12 DIAGNOSIS — Z13.9 SCREENING FOR CONDITION: ICD-10-CM

## 2021-05-12 DIAGNOSIS — R87.810 CERVICAL HIGH RISK HPV (HUMAN PAPILLOMAVIRUS) TEST POSITIVE: ICD-10-CM

## 2021-05-12 LAB
B-HCG UR QL: NEGATIVE
BILIRUB BLD-MCNC: NEGATIVE MG/DL
CLARITY, POC: CLEAR
COLOR UR: YELLOW
GLUCOSE UR STRIP-MCNC: NEGATIVE MG/DL
INTERNAL NEGATIVE CONTROL: NEGATIVE
INTERNAL POSITIVE CONTROL: POSITIVE
KETONES UR QL: NEGATIVE
LEUKOCYTE EST, POC: NEGATIVE
Lab: NORMAL
NITRITE UR-MCNC: NEGATIVE MG/ML
PH UR: 8 [PH] (ref 5–8)
PROT UR STRIP-MCNC: NEGATIVE MG/DL
RBC # UR STRIP: ABNORMAL /UL
SP GR UR: 1 (ref 1–1.03)
UROBILINOGEN UR QL: NORMAL

## 2021-05-12 PROCEDURE — 57454 BX/CURETT OF CERVIX W/SCOPE: CPT | Performed by: OBSTETRICS & GYNECOLOGY

## 2021-05-12 PROCEDURE — 81002 URINALYSIS NONAUTO W/O SCOPE: CPT | Performed by: OBSTETRICS & GYNECOLOGY

## 2021-05-12 PROCEDURE — 81025 URINE PREGNANCY TEST: CPT | Performed by: OBSTETRICS & GYNECOLOGY

## 2021-05-12 NOTE — PROGRESS NOTES
Colposcopy Procedure Note    Procedures          Indications: Most recent Pap smear showed: normal pap + HPV 4/2021 and normal pap + HPV 3/2019    Prior cervical/vaginal disease: normal exam without visible pathology  Prior cervical treatment: no treatment.  Contraception: IUD Mirena and 3/2018    Procedure Details   The risks and benefits of the procedure and Verbal informed consent obtained.  Pregnancy test: negative    Speculum placed in vagina and excellent visualization of cervix achieved, cervix swabbed x 3 with acetic acid solution and Lugol's solution     Findings:  Cervix: no mosaicism, no punctation and visible lesion(s) at 1 & 7  o'clock; cervix swabbed with Lugol's solution, endocervical curettage performed, cervical biopsies taken at 1 & 7 o'clock, specimen labelled and sent to pathology and hemostasis achieved with Monsel's solution.  Vaginal inspection: vaginal colposcopy not performed.  Vulvar colposcopy: vulvar colposcopy not performed.  Colpo adequacy: was adquate    Specimens: 1, 7, ECC    Colpo impression:  ESTEBAN 1    Complications: none.   Patient tolerated procedure well.     Smoking Status: No      Plan:  Specimens labelled and sent to Pathology.  Will base further treatment on Pathology findings.  Treatment options discussed with patient.  Post biopsy instructions given to patient.    Mariela Royal MD   5/12/2021  11:15 EDT

## 2021-05-14 LAB
DX ICD CODE: NORMAL
PATH REPORT.FINAL DX SPEC: NORMAL
PATH REPORT.GROSS SPEC: NORMAL
PATH REPORT.RELEVANT HX SPEC: NORMAL
PATH REPORT.SITE OF ORIGIN SPEC: NORMAL
PATHOLOGIST NAME: NORMAL
PAYMENT PROCEDURE: NORMAL

## 2021-06-02 ENCOUNTER — TELEPHONE (OUTPATIENT)
Dept: OBSTETRICS AND GYNECOLOGY | Facility: CLINIC | Age: 31
End: 2021-06-02

## 2021-06-02 ENCOUNTER — E-VISIT (OUTPATIENT)
Dept: FAMILY MEDICINE CLINIC | Facility: TELEHEALTH | Age: 31
End: 2021-06-02

## 2021-06-03 RX ORDER — CLINDAMYCIN PHOSPHATE 20 MG/G
1 CREAM VAGINAL NIGHTLY
Qty: 40 G | Refills: 0 | Status: SHIPPED | OUTPATIENT
Start: 2021-06-03 | End: 2021-06-10

## 2021-09-22 ENCOUNTER — OFFICE VISIT (OUTPATIENT)
Dept: OBSTETRICS AND GYNECOLOGY | Facility: CLINIC | Age: 31
End: 2021-09-22

## 2021-09-22 VITALS
DIASTOLIC BLOOD PRESSURE: 82 MMHG | SYSTOLIC BLOOD PRESSURE: 116 MMHG | HEIGHT: 67 IN | WEIGHT: 146 LBS | BODY MASS INDEX: 22.91 KG/M2

## 2021-09-22 DIAGNOSIS — N76.1 SUBACUTE VAGINITIS: ICD-10-CM

## 2021-09-22 DIAGNOSIS — Z01.419 PAP SMEAR, LOW-RISK: Primary | ICD-10-CM

## 2021-09-22 DIAGNOSIS — Z11.51 ENCOUNTER FOR SCREENING FOR HUMAN PAPILLOMAVIRUS (HPV): ICD-10-CM

## 2021-09-22 DIAGNOSIS — Z11.3 SCREEN FOR STD (SEXUALLY TRANSMITTED DISEASE): ICD-10-CM

## 2021-09-22 DIAGNOSIS — Z86.19 HISTORY OF HPV INFECTION: ICD-10-CM

## 2021-09-22 LAB
BILIRUB BLD-MCNC: NEGATIVE MG/DL
CLARITY, POC: CLEAR
COLOR UR: YELLOW
GLUCOSE UR STRIP-MCNC: NEGATIVE MG/DL
KETONES UR QL: NEGATIVE
LEUKOCYTE EST, POC: NEGATIVE
NITRITE UR-MCNC: NEGATIVE MG/ML
PH UR: 5 [PH] (ref 5–8)
PROT UR STRIP-MCNC: NEGATIVE MG/DL
RBC # UR STRIP: NEGATIVE /UL
SP GR UR: 1 (ref 1–1.03)
UROBILINOGEN UR QL: NORMAL

## 2021-09-22 PROCEDURE — 99214 OFFICE O/P EST MOD 30 MIN: CPT | Performed by: OBSTETRICS & GYNECOLOGY

## 2021-09-22 PROCEDURE — 81002 URINALYSIS NONAUTO W/O SCOPE: CPT | Performed by: OBSTETRICS & GYNECOLOGY

## 2021-09-22 RX ORDER — COVID-19 TEST SPECIMEN COLLECT
MISCELLANEOUS MISCELLANEOUS SEE ADMIN INSTRUCTIONS
COMMUNITY
Start: 2021-08-05 | End: 2022-06-22

## 2021-09-22 RX ORDER — CLINDAMYCIN HYDROCHLORIDE 300 MG/1
CAPSULE ORAL
COMMUNITY
Start: 2021-09-21 | End: 2022-06-22

## 2021-09-24 LAB
C TRACH RRNA CVX QL NAA+PROBE: NEGATIVE
CYTOLOGIST CVX/VAG CYTO: NORMAL
CYTOLOGY CVX/VAG DOC CYTO: NORMAL
CYTOLOGY CVX/VAG DOC THIN PREP: NORMAL
DX ICD CODE: NORMAL
HIV 1 & 2 AB SER-IMP: NORMAL
HPV I/H RISK 4 DNA CVX QL PROBE+SIG AMP: NEGATIVE
N GONORRHOEA RRNA CVX QL NAA+PROBE: NEGATIVE
OTHER STN SPEC: NORMAL
STAT OF ADQ CVX/VAG CYTO-IMP: NORMAL
T VAGINALIS RRNA SPEC QL NAA+PROBE: NEGATIVE

## 2021-09-28 LAB
A VAGINAE DNA VAG QL NAA+PROBE: NORMAL SCORE
BVAB2 DNA VAG QL NAA+PROBE: NORMAL SCORE
C ALBICANS DNA VAG QL NAA+PROBE: NEGATIVE
C GLABRATA DNA VAG QL NAA+PROBE: NEGATIVE
C TRACH DNA VAG QL NAA+PROBE: NEGATIVE
M GENITALIUM DNA SPEC QL NAA+PROBE: NEGATIVE
M HOMINIS DNA SPEC QL NAA+PROBE: NEGATIVE
MEGA1 DNA VAG QL NAA+PROBE: NORMAL SCORE
N GONORRHOEA DNA VAG QL NAA+PROBE: NEGATIVE
T VAGINALIS DNA VAG QL NAA+PROBE: NEGATIVE
UREAPLASMA DNA SPEC QL NAA+PROBE: POSITIVE

## 2021-09-30 RX ORDER — DOXYCYCLINE HYCLATE 100 MG
100 TABLET ORAL 2 TIMES DAILY
Qty: 14 TABLET | Refills: 0 | Status: SHIPPED | OUTPATIENT
Start: 2021-09-30 | End: 2021-10-07

## 2021-12-17 ENCOUNTER — IMMUNIZATION (OUTPATIENT)
Dept: VACCINE CLINIC | Facility: HOSPITAL | Age: 31
End: 2021-12-17

## 2021-12-17 PROCEDURE — 91300 HC SARSCOV02 VAC 30MCG/0.3ML IM: CPT | Performed by: INTERNAL MEDICINE

## 2021-12-17 PROCEDURE — 0004A HC ADM SARSCOV2 30MCG/0.3ML BOOSTER: CPT | Performed by: INTERNAL MEDICINE

## 2022-02-22 ENCOUNTER — TELEMEDICINE (OUTPATIENT)
Dept: FAMILY MEDICINE CLINIC | Facility: TELEHEALTH | Age: 32
End: 2022-02-22

## 2022-02-22 DIAGNOSIS — H66.001 ACUTE SUPPURATIVE OTITIS MEDIA OF RIGHT EAR WITHOUT SPONTANEOUS RUPTURE OF TYMPANIC MEMBRANE, RECURRENCE NOT SPECIFIED: Primary | ICD-10-CM

## 2022-02-22 DIAGNOSIS — B00.1 FEVER BLISTER: ICD-10-CM

## 2022-02-22 PROCEDURE — 99213 OFFICE O/P EST LOW 20 MIN: CPT | Performed by: NURSE PRACTITIONER

## 2022-02-22 RX ORDER — BROMPHENIRAMINE MALEATE, PSEUDOEPHEDRINE HYDROCHLORIDE, AND DEXTROMETHORPHAN HYDROBROMIDE 2; 30; 10 MG/5ML; MG/5ML; MG/5ML
10 SYRUP ORAL 4 TIMES DAILY PRN
Qty: 200 ML | Refills: 0 | Status: SHIPPED | OUTPATIENT
Start: 2022-02-22 | End: 2022-06-22

## 2022-02-22 RX ORDER — CEFDINIR 300 MG/1
300 CAPSULE ORAL 2 TIMES DAILY
Qty: 20 CAPSULE | Refills: 0 | Status: SHIPPED | OUTPATIENT
Start: 2022-02-22 | End: 2022-03-04

## 2022-02-22 RX ORDER — METHYLPREDNISOLONE 4 MG/1
TABLET ORAL
Qty: 21 TABLET | Refills: 0 | Status: SHIPPED | OUTPATIENT
Start: 2022-02-22 | End: 2022-06-22

## 2022-02-22 RX ORDER — VALACYCLOVIR HYDROCHLORIDE 500 MG/1
500 TABLET, FILM COATED ORAL 2 TIMES DAILY
Qty: 14 TABLET | Refills: 0 | Status: SHIPPED | OUTPATIENT
Start: 2022-02-22 | End: 2022-03-01

## 2022-02-22 NOTE — PROGRESS NOTES
You have chosen to receive care through a telehealth visit.  Do you consent to use a video/audio connection for your medical care today? Yes     CHIEF COMPLAINT  No chief complaint on file.        HPI  Daija Dean is a 31 y.o. female  presents with complaint of allergy/congestion on Saturday, sore throat, headache, right ear pain--lots of pressure/aching, mild nausea, cough.  Denies dizziness, fever.  Also feels like she is getting a fever blister and needs refill of Valtrex.    Took at home COVID tests on Sunday and Monday--both negative    Review of Systems   Constitutional: Negative for fatigue and fever.   HENT: Positive for congestion, ear pain, postnasal drip, rhinorrhea and sore throat. Negative for sinus pressure and sinus pain.    Respiratory: Positive for cough.    Neurological: Positive for headaches.   All other systems reviewed and are negative.      Past Medical History:   Diagnosis Date   • Abnormal Pap smear of cervix     h/o abnormal w/ nl f/u   • Anxiety and depression 3/20/2019   • Depression    • Headache    • History of genital warts     s/p TCA   • Migraine     migraines with aura   • Migraine with aura 3/20/2019   • Ovarian cyst 06/2018    h/o L ovarian cystectomy       Family History   Problem Relation Age of Onset   • No Known Problems Father    • No Known Problems Mother    • Ovarian cancer Maternal Aunt 63        great mat aunt , pt is BRCA neg   • Breast cancer Paternal Aunt         great pat aunt    • Cancer Paternal Grandfather         bladder ca   • Breast cancer Paternal Aunt         great pat aunt   • Colon cancer Neg Hx    • Deep vein thrombosis Neg Hx    • Prostate cancer Neg Hx        Social History     Socioeconomic History   • Marital status: Single   Tobacco Use   • Smoking status: Never Smoker   • Smokeless tobacco: Never Used   Substance and Sexual Activity   • Alcohol use: Yes     Comment: social   • Drug use: No   • Sexual activity: Yes     Partners: Male     Birth  control/protection: I.U.D.     Comment: Mirena placed 3/2018         There were no vitals taken for this visit.    PHYSICAL EXAM  Physical Exam   Constitutional: She is oriented to person, place, and time. She appears well-developed and well-nourished. She does not have a sickly appearance. She does not appear ill.   HENT:   Head: Normocephalic and atraumatic.   Pulmonary/Chest: Effort normal.  No respiratory distress.  Neurological: She is alert and oriented to person, place, and time.       Results for orders placed or performed in visit on 09/22/21   NuSwab VG+ - Swab, Vagina    Specimen: Vagina; Swab    [facility]     [facility]- [id number]   Result Value Ref Range    Atopobium Vaginae Low - 0 Score    BVAB 2 Low - 0 Score    Megasphaera 1 Low - 0 Score    Candida Albicans, KARMA Negative Negative    Candida Glabrata, KARMA Negative Negative    Trichomonas vaginosis Negative Negative    Chlamydia trachomatis, KARMA Negative Negative    Neisseria gonorrhoeae, KARMA Negative Negative   Genital Mycoplasmas KARMA, Swab - Swab, Vagina    Specimen: Vagina; Swab    [facility]     [facility]- [id number]   Result Value Ref Range    Mycoplasma genitalium NA Negative Negative    Mycoplasma hominis Negative Negative    Ureaplasma spp Positive (A) Negative   POC Urinalysis Dipstick    Specimen: Urine   Result Value Ref Range    Color Yellow Yellow, Straw, Dark Yellow, Tish    Clarity, UA Clear Clear    Glucose, UA Negative Negative, 1000 mg/dL (3+) mg/dL    Bilirubin Negative Negative    Ketones, UA Negative Negative    Specific Gravity  1.005 1.005 - 1.030    Blood, UA Negative Negative    pH, Urine 5.0 5.0 - 8.0    Protein, POC Negative Negative mg/dL    Urobilinogen, UA Normal Normal    Leukocytes Negative Negative    Nitrite, UA Negative Negative   IGP,CtNgTv,Apt HPV,rfx 16 / 18,45    Specimen: ThinPrep Vial   Result Value Ref Range    Diagnosis Comment     Specimen adequacy: Comment     Clinician Provided ICD-10: Comment     Performed by: Comment     . .      Note: Comment     Method: Comment     HPV Aptima Negative Negative    Chlamydia, Nuc. Acid Amp Negative Negative    Gonococcus by Nucleic Acid Amp Negative Negative    Trichomonas vaginosis Negative Negative       Diagnoses and all orders for this visit:    1. Acute suppurative otitis media of right ear without spontaneous rupture of tympanic membrane, recurrence not specified (Primary)  -     cefdinir (OMNICEF) 300 MG capsule; Take 1 capsule by mouth 2 (Two) Times a Day for 10 days.  Dispense: 20 capsule; Refill: 0  -     brompheniramine-pseudoephedrine-DM 30-2-10 MG/5ML syrup; Take 10 mL by mouth 4 (Four) Times a Day As Needed for Congestion, Cough or Allergies.  Dispense: 200 mL; Refill: 0  -     methylPREDNISolone (MEDROL) 4 MG dose pack; Take as directed on package instructions.  Dispense: 21 tablet; Refill: 0    2. Fever blister  -     valACYclovir (VALTREX) 500 MG tablet; Take 1 tablet by mouth 2 (Two) Times a Day for 7 days.  Dispense: 14 tablet; Refill: 0    --take all medications as prescribed  --if no improvement in 5-7 days will need f/u      FOLLOW-UP  As discussed during visit with PCP/[facility] if no improvement or Urgent Care/Emergency Department if worsening of symptoms    Patient verbalizes understanding of medication dosage, comfort measures, instructions for treatment and follow-up.    KORY Boykin  02/22/2022  12:13 EST    This visit was performed via Telehealth.  This patient has been instructed to follow-up with their primary care provider if their symptoms worsen or the treatment provided does not resolve their illness.

## 2022-02-22 NOTE — PATIENT INSTRUCTIONS
Otitis Media, Adult    Otitis media occurs when there is inflammation and fluid in the middle ear space with signs and symptoms of an acute infection. The middle ear is a part of the ear that contains bones for hearing as well as air that helps send sounds to the brain. When infected fluid builds up in this space, it causes pressure and results in symptoms of acute otitis media. The eustachian tube connects the middle ear to the back of the nose (nasopharynx) and normally allows air into the middle ear space. If the eustachian tube becomes blocked, fluid can build up and become infected.  What are the causes?  This condition is caused by a blockage in the eustachian tube. This can be caused by an object like mucus, or by swelling (edema) of the tube. Problems that can cause a blockage include:  · A cold or other upper respiratory infection.  · Allergies.  · An irritant, such as tobacco smoke.  · Enlarged adenoids. The adenoids are areas of soft tissue located high in the back of the throat, behind the nose and the roof of the mouth. They are part of the body's defense system (immune system).  · A mass in the nasopharynx.  · Damage to the ear caused by pressure changes (barotrauma).  What are the signs or symptoms?  Symptoms of this condition include:  · Ear pain.  · Fever.  · Decreased hearing.  · Tiredness (lethargy).  · Fluid leaking from the ear, if the eardrum is ruptured or has burst.  · Ringing in the ear.  How is this diagnosed?    This condition is diagnosed with a physical exam. During the exam, your health care provider will use an instrument called an otoscope to look in your ear and check for redness, swelling, and fluid. He or she will also ask about your symptoms.  Your health care provider may also order tests, such as:  · A pneumatic otoscopy. This is a test to check the movement of the eardrum. It is done by squeezing a small amount of air into the ear.  · A tympanogram is a test that shows how well  the eardrum moves in response to air pressure in the ear canal. It provides a graph for your health care provider to review.  How is this treated?  This condition can go away on its own within 3-5 days. But if the condition is caused by a bacterial infection and does not go away on its own, or if it keeps coming back, your health care provider may:  · Prescribe antibiotic medicine to treat the infection.  · Prescribe or recommend medicines to control pain.  Follow these instructions at home:  · Take over-the-counter and prescription medicines only as told by your health care provider.  · If you were prescribed an antibiotic medicine, take it as told by your health care provider. Do not stop taking the antibiotic even if you start to feel better.  · Keep all follow-up visits as told by your health care provider. This is important.  Contact a health care provider if:  · You have bleeding from your nose.  · There is a lump on your neck.  · You are not feeling better in 5 days.  · You feel worse instead of better.  Get help right away if:  · You have severe pain that is not controlled with medicine.  · You have swelling, redness, or pain around your ear.  · You have stiffness in your neck.  · A part of your face is not moving (paralyzed).  · The bone behind your ear (mastoid) is tender when you touch it.  · You develop a severe headache.  Summary  · Otitis media is redness, soreness, and swelling of the middle ear, usually resulting in pain.  · This condition can go away on its own within 3-5 days.  · If the problem does not go away in 3-5 days, your health care provider may prescribe or recommend medicines to treat the infection or your symptoms.  · If you were prescribed an antibiotic medicine, take it as told by your health care provider.  · Follow all instructions you were given by your health care provider.  This information is not intended to replace advice given to you by your health care provider. Make sure you  discuss any questions you have with your health care provider.  Document Revised: 11/19/2020 Document Reviewed: 11/19/2020  Elsevier Patient Education © 2021 [employer]      Cold Sore    A cold sore, also called a fever blister, is a small, fluid-filled sore that forms inside the mouth or on the lips, gums, nose, chin, or cheeks. Cold sores can spread to other parts of the body, such as the eyes or fingers. In some people who have other medical conditions, cold sores can spread to multiple other body sites, including the genitals.  Cold sores can spread from person to person (are contagious) until the sores crust over completely. Most cold sores go away within 2 weeks.  What are the causes?  Cold sores are caused by an infection from a common type of herpes simplex virus (HSV-1). HSV-1 is closely related to the HSV-2virus, which is the virus that causes genital herpes, but these viruses are not the same. Once a person is infected with HSV-1, the virus remains permanently in the body.  HSV-1 is spread from person to person through close contact, such as through kissing, touching the affected area, or sharing personal items such as lip balm, razors, a drinking glass, or eating utensils.  What increases the risk?  You are more likely to develop this condition if you:  · Are tired, stressed, or sick.  · Are menstruating.  · Are pregnant.  · Take certain medicines.  · Are exposed to cold weather or too much sun.  What are the signs or symptoms?  Symptoms of a cold sore outbreak go through different stages. These are the stages of a cold sore:  · Tingling, itching, or burning is felt 1-2 days before the outbreak.  · Fluid-filled blisters appear on the lips, inside the mouth, on the nose, or on the cheeks.  · The blisters start to ooze clear fluid.  · The blisters dry up, and a yellow crust appears in their place.  · The crust falls off.  In some cases, other symptoms can develop during a cold sore outbreak. These can  include:  · Fever.  · Sore throat.  · Headache.  · Muscle aches.  · Swollen neck glands.  How is this diagnosed?  This condition is diagnosed based on your medical history and a physical exam. Your health care provider may do a blood test or may swab some fluid from your sore and then examine the swab in the lab.  How is this treated?  There is no cure for cold sores or HSV-1. There is also no vaccine for HSV-1. Most cold sores go away on their own without treatment within 2 weeks. Medicines cannot make the infection go away, but your health care provider may prescribe medicines to:  · Help relieve some of the pain associated with the sores.  · Work to stop the virus from multiplying.  · Shorten healing time.  Medicines may be in the form of creams, gels, pills, or a shot.  Follow these instructions at home:  Medicines  · Take or apply over-the-counter and prescription medicines only as told by your health care provider.  · Use a cotton-tip swab to apply creams or gels to your sores.  · Ask your health care provider if you can take lysine supplements. Research has found that lysine may help heal the cold sore faster and prevent outbreaks.  Sore care    · Do not touch the sores or pick the scabs.  · Wash your hands often. Do not touch your eyes without washing your hands first.  · Keep the sores clean and dry.  · If directed, apply ice to the sores:  ? Put ice in a plastic bag.  ? Place a towel between your skin and the bag.  ? Leave the ice on for 20 minutes, 2-3 times a day.    Eating and drinking  · Eat a soft, bland diet. Avoid eating hot, cold, or salty foods.  · Use a straw if it hurts to drink out of a glass.  · Eat foods that are rich in lysine, such as meat, fish, and dairy products.  · Avoid sugary foods, chocolates, nuts, and grains. These foods are rich in a nutrient called arginine, which can cause the virus to multiply.  Lifestyle  · Do not kiss, have oral sex, or share personal items until your sores  heal.  · Stress, poor sleep, and being out in the sun can trigger outbreaks. Make sure you:  ? Do activities that help you relax, such as deep breathing exercises or meditation.  ? Get enough sleep.  ? Apply sunscreen on your lips before you go out in the sun.  Contact a health care provider if:  · You have symptoms for more than 2 weeks.  · You have pus coming from the sores.  · You have redness that is spreading.  · You have pain or irritation in your eye.  · You get sores on your genitals.  · Your sores do not heal within 2 weeks.  · You have frequent cold sore outbreaks.  Get help right away if you have:  · A fever and your symptoms suddenly get worse.  · A headache and confusion.  · Fatigue or loss of appetite.  · A stiff neck or sensitivity to light.  Summary  · A cold sore, also called a fever blister, is a small, fluid-filled sore that forms inside the mouth or on the lips, gums, nose, chin, or cheeks.  · Most cold sores go away on their own without treatment within 2 weeks. Your health care provider may prescribe medicines to help relieve some of the pain, work to stop the virus from multiplying, and shorten healing time.  · Wash your hands often. Do not touch your eyes without washing your hands first.  · Do not kiss, have oral sex, or share personal items until your sores heal.  · Contact a health care provider if your sores do not heal within 2 weeks.  This information is not intended to replace advice given to you by your health care provider. Make sure you discuss any questions you have with your health care provider.  Document Revised: 04/08/2020 Document Reviewed: 05/20/2019  Elsevier Patient Education © 2021 [employer]

## 2022-03-27 ENCOUNTER — TELEMEDICINE (OUTPATIENT)
Dept: FAMILY MEDICINE CLINIC | Facility: TELEHEALTH | Age: 32
End: 2022-03-27

## 2022-03-27 VITALS — WEIGHT: 146 LBS | HEIGHT: 67 IN | BODY MASS INDEX: 22.91 KG/M2

## 2022-03-27 DIAGNOSIS — N76.0 BACTERIAL VAGINOSIS: Primary | ICD-10-CM

## 2022-03-27 DIAGNOSIS — B96.89 BACTERIAL VAGINOSIS: Primary | ICD-10-CM

## 2022-03-27 PROCEDURE — 99213 OFFICE O/P EST LOW 20 MIN: CPT | Performed by: NURSE PRACTITIONER

## 2022-03-27 RX ORDER — CLINDAMYCIN HYDROCHLORIDE 300 MG/1
300 CAPSULE ORAL 2 TIMES DAILY
Qty: 14 CAPSULE | Refills: 0 | Status: SHIPPED | OUTPATIENT
Start: 2022-03-27 | End: 2022-04-03

## 2022-03-27 NOTE — PROGRESS NOTES
You have chosen to receive care through a telehealth visit.  Do you consent to use a video/audio connection for your medical care today? Yes     CHIEF COMPLAINT  Cc;  Vaginal discharge    HPI  Daija Dean is a 31 y.o. female  presents with complaint of vaginal discharge. She reports that she has vaginal discharge that has a fishy odor to it. It is clear in color. She does report having a history of bacterial vaginosis. She is also experiencing some abdominal discomfort. Additional symptoms as noted in the ROS portion of this visit.She is requesting clindamycin for this problem.    Review of Systems   Constitutional: Negative for fever.   HENT: Positive for congestion (allergies). Negative for sore throat.    Respiratory: Negative for cough.    Cardiovascular: Negative for chest pain.   Gastrointestinal: Positive for diarrhea (baseline). Negative for nausea and vomiting.   Genitourinary: Positive for dysuria (mild), pelvic pain, urgency and vaginal discharge. Negative for frequency and genital sores.   Musculoskeletal: Negative for myalgias.   Neurological: Positive for headaches.       Past Medical History:   Diagnosis Date   • Abnormal Pap smear of cervix     h/o abnormal w/ nl f/u   • Anxiety and depression 3/20/2019   • Depression    • Headache    • History of genital warts     s/p TCA   • Migraine     migraines with aura   • Migraine with aura 3/20/2019   • Ovarian cyst 06/2018    h/o L ovarian cystectomy       Family History   Problem Relation Age of Onset   • No Known Problems Father    • No Known Problems Mother    • Ovarian cancer Maternal Aunt 63        great mat aunt , pt is BRCA neg   • Breast cancer Paternal Aunt         great pat aunt    • Cancer Paternal Grandfather         bladder ca   • Breast cancer Paternal Aunt         great pat aunt   • Colon cancer Neg Hx    • Deep vein thrombosis Neg Hx    • Prostate cancer Neg Hx        Social History     Socioeconomic History   • Marital status: Single  "  Tobacco Use   • Smoking status: Never Smoker   • Smokeless tobacco: Never Used   Substance and Sexual Activity   • Alcohol use: Yes     Comment: social   • Drug use: No   • Sexual activity: Yes     Partners: Male     Birth control/protection: I.U.D.     Comment: Mirena placed 3/2018       Daija Dean  reports that she has never smoked. She has never used smokeless tobacco..         Ht 170.2 cm (67\")   Wt 66.2 kg (146 lb)   BMI 22.87 kg/m²     PHYSICAL EXAM  Physical Exam   Constitutional: She is oriented to person, place, and time. She appears well-developed and well-nourished.   HENT:   Head: Normocephalic and atraumatic.   Right Ear: External ear normal.   Left Ear: External ear normal.   Nose: Nose normal.   Mouth/Throat: Mouth/Lips are normal.  Eyes: Lids are normal. Right eye exhibits no discharge and no exudate. Left eye exhibits no discharge and no exudate. Right conjunctiva is not injected. Left conjunctiva is not injected.   Pulmonary/Chest: No accessory muscle usage. No tachypnea and no bradypnea.  No respiratory distress.No use of oxygen by nasal cannulaNo use of oxygen by mask noted.  Abdominal: Abdomen appears normal.   Abdominal discomfort reported   Neurological: She is alert and oriented to person, place, and time. No cranial nerve deficit.   Skin: Her skin appears normal.  Psychiatric: She has a normal mood and affect. Her speech is normal and behavior is normal. Judgment and thought content normal.       Results for orders placed or performed in visit on 09/22/21   Nuab VG+ - Swab, Vagina    Specimen: Vagina; Swab    [facility]- [id number]   Result Value Ref Range    Atopobium Vaginae Low - 0 Score    BVAB 2 Low - 0 Score    Megasphaera 1 Low - 0 Score    Candida Albicans, KARMA Negative Negative    Candida Glabrata, KARMA Negative Negative    Trichomonas vaginosis Negative Negative    Chlamydia trachomatis, KARMA Negative Negative    Neisseria gonorrhoeae, KARMA Negative Negative   Genital " Mycoplasmas KARMA, Swab - Swab, Vagina    Specimen: Vagina; Swab    [facility]     [facility]- [id number]   Result Value Ref Range    Mycoplasma genitalium NA Negative Negative    Mycoplasma hominis Negative Negative    Ureaplasma spp Positive (A) Negative   POC Urinalysis Dipstick    Specimen: Urine   Result Value Ref Range    Color Yellow Yellow, Straw, Dark Yellow, Tish    Clarity, UA Clear Clear    Glucose, UA Negative Negative, 1000 mg/dL (3+) mg/dL    Bilirubin Negative Negative    Ketones, UA Negative Negative    Specific Gravity  1.005 1.005 - 1.030    Blood, UA Negative Negative    pH, Urine 5.0 5.0 - 8.0    Protein, POC Negative Negative mg/dL    Urobilinogen, UA Normal Normal    Leukocytes Negative Negative    Nitrite, UA Negative Negative   IGP,CtNgTv,Apt HPV,rfx 16 / 18,45    Specimen: ThinPrep Vial   Result Value Ref Range    Diagnosis Comment     Specimen adequacy: Comment     Clinician Provided ICD-10: Comment     Performed by: Comment     . .     Note: Comment     Method: Comment     HPV Aptima Negative Negative    Chlamydia, Nuc. Acid Amp Negative Negative    Gonococcus by Nucleic Acid Amp Negative Negative    Trichomonas vaginosis Negative Negative       Diagnoses and all orders for this visit:    1. Bacterial vaginosis (Primary)    Other orders  -     clindamycin (Cleocin) 300 MG capsule; Take 1 capsule by mouth 2 (Two) Times a Day for 7 days.  Dispense: 14 capsule; Refill: 0    Probiotics for one month related to taking antibiotics. The pharmacist can help you with this if needed. Do not take within two hours of antibiotic.    FOLLOW-UP  If symptoms worsen or persist follow up with PCP, ob/gyn or Urgent Care for an in person evaluation    Patient verbalizes understanding of medication dosage, comfort measures, instructions for treatment and follow-up.    KORY Yoon  03/27/2022  12:21 EDT    This visit was performed via Telehealth.  This patient has been instructed to follow-up with their primary care  provider if their symptoms worsen or the treatment provided does not resolve their illness.

## 2022-06-22 ENCOUNTER — OFFICE VISIT (OUTPATIENT)
Dept: OBSTETRICS AND GYNECOLOGY | Facility: CLINIC | Age: 32
End: 2022-06-22

## 2022-06-22 VITALS
HEIGHT: 67 IN | WEIGHT: 142 LBS | SYSTOLIC BLOOD PRESSURE: 118 MMHG | DIASTOLIC BLOOD PRESSURE: 70 MMHG | BODY MASS INDEX: 22.29 KG/M2

## 2022-06-22 DIAGNOSIS — Z11.51 SPECIAL SCREENING EXAMINATION FOR HUMAN PAPILLOMAVIRUS (HPV): ICD-10-CM

## 2022-06-22 DIAGNOSIS — Z01.419 PAP SMEAR, LOW-RISK: Primary | ICD-10-CM

## 2022-06-22 DIAGNOSIS — Z01.419 ROUTINE GYNECOLOGICAL EXAMINATION: ICD-10-CM

## 2022-06-22 LAB
B-HCG UR QL: NEGATIVE
BILIRUB BLD-MCNC: NEGATIVE MG/DL
CLARITY, POC: CLEAR
COLOR UR: YELLOW
EXPIRATION DATE: NORMAL
GLUCOSE UR STRIP-MCNC: NEGATIVE MG/DL
INTERNAL NEGATIVE CONTROL: NEGATIVE
INTERNAL POSITIVE CONTROL: POSITIVE
KETONES UR QL: NEGATIVE
LEUKOCYTE EST, POC: NEGATIVE
Lab: NORMAL
NITRITE UR-MCNC: NEGATIVE MG/ML
PH UR: 6 [PH] (ref 5–8)
PROT UR STRIP-MCNC: NEGATIVE MG/DL
RBC # UR STRIP: NEGATIVE /UL
SP GR UR: 1.02 (ref 1–1.03)
UROBILINOGEN UR QL: NORMAL

## 2022-06-22 PROCEDURE — 99395 PREV VISIT EST AGE 18-39: CPT | Performed by: OBSTETRICS & GYNECOLOGY

## 2022-06-22 PROCEDURE — 81025 URINE PREGNANCY TEST: CPT | Performed by: OBSTETRICS & GYNECOLOGY

## 2022-06-22 PROCEDURE — 81002 URINALYSIS NONAUTO W/O SCOPE: CPT | Performed by: OBSTETRICS & GYNECOLOGY

## 2022-06-22 RX ORDER — VALACYCLOVIR HYDROCHLORIDE 500 MG/1
500 TABLET, FILM COATED ORAL
COMMUNITY
Start: 2022-02-22 | End: 2022-09-28 | Stop reason: SDUPTHER

## 2022-06-22 NOTE — PROGRESS NOTES
GYN Annual Exam     CC- Here for annual exam.     Daija Dean is a 31 y.o. female established patient who presents for annual well woman exam. No cycles with Mirena, which was replaced in [place] in 3/2018. We discussed that Mirena is now good for 7 years for contraception and 5 years for cycle control. We also discussed the options re: egg freezing and she was given info on local mAy to review. . She had a normal pap + HPV x 2 years and her bx were normal.  Her pap/HPV were normal in 2021. If this pap is normal she can return to yearly screening.     OB History        0    Para   0    Term   0       0    AB   0    Living   0       SAB   0    IAB   0    Ectopic   0    Molar   0    Multiple   0    Live Births   0          Obstetric Comments   No plans             Menarche: 11  Current contraception: IUD- mirena 3/2018  History of abnormal Pap smear: yes, intermittent abnormals, no procedure  History of abnormal mammogram: no  Family history of uterine, colon or ovarian cancer: yes - M great aunt ovarian , pt is BRCA neg  Family history of breast cancer: yes - 2 paternal great aunts> 50  H/o STDs: h/o genital warts  Gardasil: 3/3  DAWNA: none  Last pap: 2021- nl pap/HPV    Health Maintenance   Topic Date Due   • ANNUAL PHYSICAL  2022   • Annual Gynecologic Pelvic and Breast Exam  2022   • INFLUENZA VACCINE  10/01/2022   • PAP SMEAR  2024   • TDAP/TD VACCINES (2 - Td or Tdap) 2029   • HEPATITIS C SCREENING  Completed   • COVID-19 Vaccine  Completed   • Pneumococcal Vaccine 0-64  Aged Out       Past Medical History:   Diagnosis Date   • Abnormal Pap smear of cervix     h/o abnormal w/ nl f/u   • Anxiety and depression 2019   • Depression    • Headache    • History of genital warts     s/p TCA   • HPV (human papilloma virus) infection    • Migraine     migraines with aura   • Migraine with aura 2019   • Ovarian cyst 2018    h/o L ovarian cystectomy       Past  Surgical History:   Procedure Laterality Date   • AUGMENTATION MAMMAPLASTY     • BREAST SURGERY      saline implants   • DIAGNOSTIC LAPAROSCOPY Left 6/27/2018    Procedure: DIAGNOSTIC LAPAROSCOPY with left ovarian cystectomy;  Surgeon: Brittani Gregg DO;  Location: [facility];  Service: Obstetrics/Gynecology   • WISDOM TOOTH EXTRACTION           Current Outpatient Medications:   •  valACYclovir (VALTREX) 500 MG tablet, Take 500 mg by mouth., Disp: , Rfl:   •  albuterol sulfate  (90 Base) MCG/ACT inhaler, Inhale 2 puffs Every 4 (Four) Hours As Needed for Wheezing., Disp: 18 g, Rfl: 0  •  budesonide-formoterol (SYMBICORT) 80-4.5 MCG/ACT inhaler, Inhale 2 puffs 2 (Two) Times a Day., Disp: 10.2 g, Rfl: 6  •  cetirizine (zyrTEC) 10 MG tablet, Take 10 mg by mouth Daily., Disp: , Rfl:   •  EPINEPHrine (EPIPEN) 0.3 MG/0.3ML solution auto-injector injection, inject the contents of 1 syringe as needed in the case of severe allergic reaction to shellfish. if used, proceed directly to the nearest emergency room. may repeat second dose in 5 to 7 minutes if needed, Disp: 2 each, Rfl: 3  •  fluticasone (FLONASE) 50 MCG/ACT nasal spray, 2 sprays into the nostril(s) as directed by provider Daily., Disp: 16 g, Rfl: 6  •  levonorgestrel (MIRENA) 20 MCG/24HR IUD, 1 each by Intrauterine route., Disp: , Rfl:   •  sertraline (ZOLOFT) 50 MG tablet, TAKE 1 TABLET BY MOUTH EVERY DAY, Disp: 90 tablet, Rfl: 0  •  topiramate (TOPAMAX) 100 MG tablet, Take 1 tablet by mouth every night at bedtime - dont get pregnant while on this medication, Disp: 90 tablet, Rfl: 1    Allergies   Allergen Reactions   • Decadron [Dexamethasone] Itching   • Shellfish-Derived Products Rash       Social History     Tobacco Use   • Smoking status: Never Smoker   • Smokeless tobacco: Never Used   Vaping Use   • Vaping Use: Never used   Substance Use Topics   • Alcohol use: Yes     Comment: social   • Drug use: No       Family History   Problem Relation Age of  "Onset   • No Known Problems Father    • No Known Problems Mother    • Ovarian cancer Maternal Aunt 63        great mat aunt , pt is BRCA neg   • Breast cancer Paternal Aunt         great pat aunt    • Cancer Paternal Grandfather         bladder ca   • Breast cancer Paternal Aunt         great pat aunt   • Colon cancer Neg Hx    • Deep vein thrombosis Neg Hx    • Prostate cancer Neg Hx        Review of Systems   Constitutional: Positive for activity change. Negative for appetite change, fatigue, fever and unexpected weight change.   Respiratory: Negative for cough and shortness of breath.    Cardiovascular: Negative for chest pain and palpitations.   Gastrointestinal: Negative for abdominal distention, abdominal pain, constipation, diarrhea and nausea.   Endocrine: Negative for cold intolerance.   Genitourinary: Negative for dyspareunia, dysuria, menstrual problem, pelvic pain, vaginal bleeding and vaginal discharge.   Skin: Negative for color change and rash.   Psychiatric/Behavioral: Negative for dysphoric mood. The patient is not nervous/anxious.    All other systems reviewed and are negative.      /70   Ht 170.2 cm (67\")   Wt 64.4 kg (142 lb)   BMI 22.24 kg/m²     Physical Exam   Constitutional: She is oriented to person, place, and time. She appears well-developed.   HENT:   Head: Normocephalic and atraumatic.   Eyes: Conjunctivae are normal. No scleral icterus.   Neck: No thyromegaly present.   Cardiovascular: Normal rate and regular rhythm.   Pulmonary/Chest: Effort normal and breath sounds normal. Right breast exhibits no inverted nipple, no mass, no nipple discharge, no skin change and no tenderness. Left breast exhibits no inverted nipple, no mass, no nipple discharge, no skin change and no tenderness.   B implants noted   Abdominal: Soft. Normal appearance and bowel sounds are normal. She exhibits no distension and no mass. There is no abdominal tenderness. There is no rebound and no guarding. No " hernia.   Genitourinary:    Pelvic exam was performed with patient supine.   There is no rash, tenderness, lesion or injury on the right labia. There is no rash, tenderness, lesion or injury on the left labia. Uterus is not deviated, not enlarged, not fixed and not tender. Cervix exhibits no motion tenderness, no lesion, no discharge and no friability. Right adnexum displays no mass, no tenderness and no fullness. Left adnexum displays no mass, no tenderness and no fullness.    No vaginal discharge, erythema, tenderness or bleeding.   No erythema, tenderness or bleeding in the vagina.    No foreign body in the vagina.      No signs of injury in the vagina.      Genitourinary Comments: IUD string not seen     Neurological: She is alert and oriented to person, place, and time.   Skin: Skin is warm and dry.   Psychiatric: Her behavior is normal. Mood, judgment and thought content normal.   Nursing note and vitals reviewed.         Assessment/Plan    1) GYN HM: check pap/HPV   SBE demonstrated and encouraged.  2) STD screening: accepts. Condoms encouraged.  3) Contraception: Mirena IUD placed 3/2018 in [place], string not seen. Was seen by US in 6/2020. Good until 3/2025   4) Family Planning: no plans at present, encourage folic acid daily. Info on RENETTA given for oocyte cryppreservation  5) Diet and Exercise discussed  6) Smoking Status: non smoker  7) Social: , works from home  8) MMG-  Plan age 38   9)Parts of this document have been copied or forwarded from her previous visits and have been reviewed, updated and edited as indicated.   10)I saw the patient with a face mask, gloves and eye protection  The patient herself was masked.  Social distancing was observed as appropriate.  11)Follow up prn or 1 year annual       Diagnoses and all orders for this visit:    1. Pap smear, low-risk (Primary)  -     Cancel: IgP, Aptima HPV  -     IGP,CtNgTv,Apt HPV,rfx 16 / 18,45  -     Hepatitis B Surface Antigen  -      Hepatitis C Antibody  -     HIV-1 / O / 2 Ag / Antibody 4th Generation  -     HSV 1 & 2 - Specific Antibody, IgG  -     RPR, Rfx Qn RPR / Confirm TP    2. Routine gynecological examination  -     POC Urinalysis Dipstick  -     POC Pregnancy, Urine  -     Cancel: IgP, Aptima HPV  -     IGP,CtNgTv,Apt HPV,rfx 16 / 18,45  -     Hepatitis B Surface Antigen  -     Hepatitis C Antibody  -     HIV-1 / O / 2 Ag / Antibody 4th Generation  -     HSV 1 & 2 - Specific Antibody, IgG  -     RPR, Rfx Qn RPR / Confirm TP    3. Special screening examination for human papillomavirus (HPV)  -     Cancel: IgP, Aptima HPV  -     IGP,CtNgTv,Apt HPV,rfx 16 / 18,45  -     Hepatitis B Surface Antigen  -     Hepatitis C Antibody  -     HIV-1 / O / 2 Ag / Antibody 4th Generation  -     HSV 1 & 2 - Specific Antibody, IgG  -     RPR, Rfx Qn RPR / Confirm TP          Mariela Royal MD  6/22/2022  13:18 EDT

## 2022-06-23 LAB
HBV SURFACE AG SERPL QL IA: NEGATIVE
HCV AB S/CO SERPL IA: <0.1 S/CO RATIO (ref 0–0.9)
HIV 1+2 AB+HIV1 P24 AG SERPL QL IA: NON REACTIVE
HSV1 IGG SER IA-ACNC: 36.5 INDEX (ref 0–0.9)
HSV2 IGG SER IA-ACNC: <0.91 INDEX (ref 0–0.9)
RPR SER QL: NON REACTIVE

## 2022-06-28 LAB
C TRACH RRNA CVX QL NAA+PROBE: NEGATIVE
CYTOLOGIST CVX/VAG CYTO: NORMAL
CYTOLOGY CVX/VAG DOC CYTO: NORMAL
CYTOLOGY CVX/VAG DOC THIN PREP: NORMAL
DX ICD CODE: NORMAL
HIV 1 & 2 AB SER-IMP: NORMAL
HPV I/H RISK 4 DNA CVX QL PROBE+SIG AMP: NEGATIVE
Lab: NORMAL
N GONORRHOEA RRNA CVX QL NAA+PROBE: NEGATIVE
OTHER STN SPEC: NORMAL
STAT OF ADQ CVX/VAG CYTO-IMP: NORMAL
T VAGINALIS RRNA SPEC QL NAA+PROBE: NEGATIVE

## 2022-07-16 ENCOUNTER — TELEMEDICINE (OUTPATIENT)
Dept: FAMILY MEDICINE CLINIC | Facility: TELEHEALTH | Age: 32
End: 2022-07-16

## 2022-07-16 DIAGNOSIS — L24.4 IRRITANT CONTACT DERMATITIS DUE TO DRUG IN CONTACT WITH SKIN: Primary | ICD-10-CM

## 2022-07-16 PROBLEM — K64.9 HEMORRHOID: Status: ACTIVE | Noted: 2022-07-16

## 2022-07-16 PROBLEM — G43.009 MIGRAINE WITHOUT AURA AND WITHOUT STATUS MIGRAINOSUS, NOT INTRACTABLE: Status: ACTIVE | Noted: 2019-01-16

## 2022-07-16 PROBLEM — B37.31 VAGINAL CANDIDIASIS: Status: ACTIVE | Noted: 2018-01-29

## 2022-07-16 PROBLEM — G43.909 HEADACHE, MIGRAINE: Status: ACTIVE | Noted: 2019-03-20

## 2022-07-16 PROCEDURE — 99213 OFFICE O/P EST LOW 20 MIN: CPT | Performed by: NURSE PRACTITIONER

## 2022-07-16 RX ORDER — METHYLPREDNISOLONE 4 MG/1
TABLET ORAL
Qty: 21 TABLET | Refills: 0 | Status: SHIPPED | OUTPATIENT
Start: 2022-07-16 | End: 2022-09-28

## 2022-07-16 NOTE — PROGRESS NOTES
CHIEF COMPLAINT  No chief complaint on file.        HPI  Daija Dean is a 31 y.o. female  presents with complaint of rash after using some medication for rosacea. She is using some cream that was prescribed when her face and chest broke out in a rash. She is using triaminolone cream on the rash, but this has not helped. In the past, she had had to have a medrol dose pack or a steroid injection. She has had a reaction to dexamethasone, but the methylprednisolone has not caused itching or rash and is usually what she takes.     Review of Systems   Constitutional: Negative for chills, diaphoresis, fatigue and fever.   Skin: Positive for rash (face and chest ).       Past Medical History:   Diagnosis Date   • Abnormal Pap smear of cervix     h/o abnormal w/ nl f/u   • Anxiety and depression 03/20/2019   • Depression    • Headache    • History of genital warts     s/p TCA   • HPV (human papilloma virus) infection    • Migraine     migraines with aura   • Migraine with aura 03/20/2019   • Ovarian cyst 06/2018    h/o L ovarian cystectomy       Family History   Problem Relation Age of Onset   • No Known Problems Father    • No Known Problems Mother    • Ovarian cancer Maternal Aunt 63        great mat aunt , pt is BRCA neg   • Breast cancer Paternal Aunt         great pat aunt    • Cancer Paternal Grandfather         bladder ca   • Breast cancer Paternal Aunt         great pat aunt   • Colon cancer Neg Hx    • Deep vein thrombosis Neg Hx    • Prostate cancer Neg Hx        Social History     Socioeconomic History   • Marital status: Single   Tobacco Use   • Smoking status: Never Smoker   • Smokeless tobacco: Never Used   Vaping Use   • Vaping Use: Never used   Substance and Sexual Activity   • Alcohol use: Yes     Comment: social   • Drug use: No   • Sexual activity: Yes     Partners: Male     Birth control/protection: I.U.D.     Comment: Mirena placed 3/2018       Daija Dean  reports that she has never smoked. She  has never used smokeless tobacco.        There were no vitals taken for this visit.    PHYSICAL EXAM  Physical Exam   Constitutional: She is oriented to person, place, and time. She appears well-developed and well-nourished. She does not have a sickly appearance. She does not appear ill. No distress.   HENT:   Head: Normocephalic and atraumatic.   Eyes: EOM are normal.   Neck: Neck normal appearance.  Pulmonary/Chest: Effort normal.  No respiratory distress.  Neurological: She is alert and oriented to person, place, and time.   Skin: Skin is dry. Rash (erythema, edema and tiny papules across her cheeks bilaterally and on her chest. ) noted.   Psychiatric: She has a normal mood and affect.         Diagnoses and all orders for this visit:    1. Irritant contact dermatitis due to drug in contact with skin (Primary)    Other orders  -     methylPREDNISolone (MEDROL) 4 MG dose pack; Take as directed on package instructions.  Dispense: 21 tablet; Refill: 0        The use of a video visit has been reviewed with the patient and verbal informd consent has een obtained. Myself and Daija Dean participated in this visit. The patient is located in [address] [place] [address]. I am located in [place], [place]. Mychart and Zoom were utilized. I spent 14 minutes in the patient's chart for this visit.           Lucita Silva, KORY  07/16/2022  10:27 EDT

## 2022-07-16 NOTE — PATIENT INSTRUCTIONS
Cool compressed for itching. Cream that caused rash added to your allergy list   If symptoms do not improve in 3-5 days follow up with your primary care provider or urgent care    Contact Dermatitis  Dermatitis is redness, soreness, and swelling (inflammation) of the skin. Contact dermatitis is a reaction to something that touches the skin.  There are two types of contact dermatitis:  Irritant contact dermatitis. This happens when something bothers (irritates) your skin, like soap.  Allergic contact dermatitis. This is caused when you are exposed to something that you are allergic to, such as poison ivy.  What are the causes?  Common causes of irritant contact dermatitis include:  Makeup.  Soaps.  Detergents.  Bleaches.  Acids.  Metals, such as nickel.  Common causes of allergic contact dermatitis include:  Plants.  Chemicals.  Jewelry.  Latex.  Medicines.  Preservatives in products, such as clothing.  What increases the risk?  Having a job that exposes you to things that bother your skin.  Having asthma or eczema.  What are the signs or symptoms?    Symptoms may happen anywhere the irritant has touched your skin. Symptoms include:  Dry or flaky skin.  Redness.  Cracks.  Itching.  Pain or a burning feeling.  Blisters.  Blood or clear fluid draining from skin cracks.  With allergic contact dermatitis, swelling may occur. This may happen in places such as the eyelids, mouth, or genitals.  How is this treated?  This condition is treated by checking for the cause of the reaction and protecting your skin. Treatment may also include:  Steroid creams, ointments, or medicines.  Antibiotic medicines or other ointments, if you have a skin infection.  Lotion or medicines to help with itching.  A bandage (dressing).  Follow these instructions at home:  Skin care  Moisturize your skin as needed.  Put cool cloths on your skin.  Put a baking soda paste on your skin. Stir water into baking soda until it looks like a paste.  Do not  scratch your skin.  Avoid having things rub up against your skin.  Avoid the use of soaps, perfumes, and dyes.  Medicines  Take or apply over-the-counter and prescription medicines only as told by your doctor.  If you were prescribed an antibiotic medicine, take or apply it as told by your doctor. Do not stop using it even if your condition starts to get better.  Bathing  Take a bath with:  Epsom salts.  Baking soda.  Colloidal oatmeal.  Bathe less often.  Bathe in warm water. Avoid using hot water.  Bandage care  If you were given a bandage, change it as told by your health care provider.  Wash your hands with soap and water before and after you change your bandage. If soap and water are not available, use hand .  General instructions  Avoid the things that caused your reaction. If you do not know what caused it, keep a journal. Write down:  What you eat.  What skin products you use.  What you drink.  What you wear in the area that has symptoms. This includes jewelry.  Check the affected areas every day for signs of infection. Check for:  More redness, swelling, or pain.  More fluid or blood.  Warmth.  Pus or a bad smell.  Keep all follow-up visits as told by your doctor. This is important.  Contact a doctor if:  You do not get better with treatment.  Your condition gets worse.  You have signs of infection, such as:  More swelling.  Tenderness.  More redness.  Soreness.  Warmth.  You have a fever.  You have new symptoms.  Get help right away if:  You have a very bad headache.  You have neck pain.  Your neck is stiff.  You throw up (vomit).  You feel very sleepy.  You see red streaks coming from the area.  Your bone or joint near the area hurts after the skin has healed.  The area turns darker.  You have trouble breathing.  Summary  Dermatitis is redness, soreness, and swelling of the skin.  Symptoms may occur where the irritant has touched you.  Treatment may include medicines and skin care.  If you do not  know what caused your reaction, keep a journal.  Contact a doctor if your condition gets worse or you have signs of infection.  This information is not intended to replace advice given to you by your health care provider. Make sure you discuss any questions you have with your health care provider.  Document Revised: 04/08/2020 Document Reviewed: 07/03/2019  Elsevier Patient Education © 2021 [employer]

## 2022-09-28 ENCOUNTER — OFFICE VISIT (OUTPATIENT)
Dept: FAMILY MEDICINE CLINIC | Facility: CLINIC | Age: 32
End: 2022-09-28

## 2022-09-28 VITALS
WEIGHT: 144 LBS | SYSTOLIC BLOOD PRESSURE: 116 MMHG | DIASTOLIC BLOOD PRESSURE: 82 MMHG | HEART RATE: 69 BPM | OXYGEN SATURATION: 100 % | BODY MASS INDEX: 22.6 KG/M2 | RESPIRATION RATE: 14 BRPM | HEIGHT: 67 IN

## 2022-09-28 DIAGNOSIS — F32.A ANXIETY AND DEPRESSION: ICD-10-CM

## 2022-09-28 DIAGNOSIS — F41.9 ANXIETY AND DEPRESSION: ICD-10-CM

## 2022-09-28 DIAGNOSIS — Z13.228 SCREENING FOR METABOLIC DISORDER: ICD-10-CM

## 2022-09-28 DIAGNOSIS — Z00.00 PREVENTATIVE HEALTH CARE: Primary | ICD-10-CM

## 2022-09-28 DIAGNOSIS — G43.009 MIGRAINE WITHOUT AURA AND WITHOUT STATUS MIGRAINOSUS, NOT INTRACTABLE: ICD-10-CM

## 2022-09-28 DIAGNOSIS — Z13.220 SCREENING, LIPID: ICD-10-CM

## 2022-09-28 PROCEDURE — 0124A PR ADM SARSCOV2 30MCG/0.3ML BST: CPT | Performed by: NURSE PRACTITIONER

## 2022-09-28 PROCEDURE — 91312 COVID-19 (PFIZER) BIVALENT BOOSTER 12+YRS: CPT | Performed by: NURSE PRACTITIONER

## 2022-09-28 PROCEDURE — 90471 IMMUNIZATION ADMIN: CPT | Performed by: NURSE PRACTITIONER

## 2022-09-28 PROCEDURE — 90686 IIV4 VACC NO PRSV 0.5 ML IM: CPT | Performed by: NURSE PRACTITIONER

## 2022-09-28 PROCEDURE — 99213 OFFICE O/P EST LOW 20 MIN: CPT | Performed by: NURSE PRACTITIONER

## 2022-09-28 PROCEDURE — 99395 PREV VISIT EST AGE 18-39: CPT | Performed by: NURSE PRACTITIONER

## 2022-09-28 RX ORDER — BUSPIRONE HYDROCHLORIDE 5 MG/1
5 TABLET ORAL 2 TIMES DAILY
Qty: 60 TABLET | Refills: 1 | Status: SHIPPED | OUTPATIENT
Start: 2022-09-28 | End: 2022-11-28 | Stop reason: SDUPTHER

## 2022-09-28 RX ORDER — SERTRALINE HYDROCHLORIDE 100 MG/1
100 TABLET, FILM COATED ORAL DAILY
Qty: 30 TABLET | Refills: 3 | Status: SHIPPED | OUTPATIENT
Start: 2022-09-28 | End: 2022-12-21 | Stop reason: SDUPTHER

## 2022-09-28 RX ORDER — VALACYCLOVIR HYDROCHLORIDE 500 MG/1
500 TABLET, FILM COATED ORAL 2 TIMES DAILY
Qty: 6 TABLET | Refills: 0 | Status: SHIPPED | OUTPATIENT
Start: 2022-09-28 | End: 2022-10-02

## 2022-09-28 NOTE — PROGRESS NOTES
Subjective   Daija Dean is a 32 y.o. female.     History of Present Illness   Established patient, new to this provider. Here for annual exam, labs, updated vaccines.  She has acute concerns today with worsening anxiety and depression as well as migraines.    Chronic anxiety and depression x years. Symptoms are worse. Anxiety is worse. Managed on sertraline 50 qd x years. Denies SI/HI. PHQ-9 Total Score: 12, YINA 7  Score 17.  Patient reports she has racing thoughts at times.  She is also going to fertility clinic to have her IUD taken out and her eggs frozen.  This is causing more stress.  Patient also has high stress position as [employer] .    Chronic migraine x years. She is managed on topiramax historically and she stopped this due to trying to get pregnant soon. She is interested in daily meds to prevent migraines.     Chronic  Asthma x years. Managed on symbicort 80-4.5 2 puff bid and albuterol as rescue. Nonsmoker. Denies cough, wheezing, SOA, no sputum.     The following portions of the patient's history were reviewed and updated as appropriate: allergies, current medications, past family history, past medical history, past social history, past surgical history and problem list.    Review of Systems   Constitutional: Negative for activity change, fatigue, unexpected weight gain and unexpected weight loss.   HENT: Negative for congestion and postnasal drip.         Dental exam is up-to-date   Eyes: Negative for blurred vision and double vision.        Eye exam is up-to-date   Respiratory: Negative for cough, chest tightness, shortness of breath and wheezing.    Cardiovascular: Negative for chest pain, palpitations and leg swelling.   Gastrointestinal: Negative for abdominal pain, constipation, diarrhea and GERD.   Endocrine: Negative for cold intolerance, heat intolerance, polydipsia, polyphagia and polyuria.   Genitourinary: Negative for dysuria and frequency.   Musculoskeletal: Negative for  arthralgias.   Neurological: Positive for headache. Negative for dizziness.   Hematological: Does not bruise/bleed easily.   Psychiatric/Behavioral: Positive for depressed mood. The patient is nervous/anxious.        Objective   Physical Exam  Vitals reviewed.   Constitutional:       Appearance: Normal appearance. She is normal weight.   HENT:      Head: Normocephalic.      Right Ear: Tympanic membrane normal.      Left Ear: Tympanic membrane normal.      Nose: Nose normal.      Mouth/Throat:      Mouth: Mucous membranes are moist.   Eyes:      Pupils: Pupils are equal, round, and reactive to light.   Cardiovascular:      Rate and Rhythm: Normal rate and regular rhythm.      Pulses: Normal pulses.      Heart sounds: Normal heart sounds.   Pulmonary:      Effort: Pulmonary effort is normal.      Breath sounds: Normal breath sounds.   Abdominal:      General: Abdomen is flat. Bowel sounds are normal.      Palpations: Abdomen is soft.   Musculoskeletal:         General: Normal range of motion.      Cervical back: Normal range of motion.   Skin:     General: Skin is warm.   Neurological:      General: No focal deficit present.      Mental Status: She is alert.   Psychiatric:         Mood and Affect: Mood is anxious and depressed.         Vitals:    09/28/22 1548   BP: 116/82   Pulse: 69   Resp: 14   SpO2: 100%     Body mass index is 22.55 kg/m².    Procedures    Assessment & Plan   Problems Addressed this Visit        Mental Health    Anxiety and depression    Relevant Medications    sertraline (ZOLOFT) 100 MG tablet    busPIRone (BUSPAR) 5 MG tablet       Neuro    Migraine without aura and without status migrainosus, not intractable    Relevant Medications    sertraline (ZOLOFT) 100 MG tablet      Other Visit Diagnoses     Preventative health care    -  Primary    Screening for metabolic disorder        Relevant Orders    Comprehensive Metabolic Panel    CBC & Differential    TSH    Screening, lipid        Relevant  Orders    Lipid Panel With / Chol / HDL Ratio      Diagnoses       Codes Comments    Preventative health care    -  Primary ICD-10-CM: Z00.00  ICD-9-CM: V70.0     Anxiety and depression     ICD-10-CM: F41.9, F32.A  ICD-9-CM: 300.00, 311     Screening for metabolic disorder     ICD-10-CM: Z13.228  ICD-9-CM: V77.99     Screening, lipid     ICD-10-CM: Z13.220  ICD-9-CM: V77.91     Migraine without aura and without status migrainosus, not intractable     ICD-10-CM: G43.009  ICD-9-CM: 346.10         Orders Placed This Encounter   Procedures   • COVID-19 Bivalent Booster (Pfizer) 12+yrs   • FluLaval/Fluzone >6 mos ([id number])   • Comprehensive Metabolic Panel     Order Specific Question:   Release to patient     Answer:   Routine Release   • Lipid Panel With / Chol / HDL Ratio     Order Specific Question:   Release to patient     Answer:   Routine Release   • TSH     Order Specific Question:   Release to patient     Answer:   Routine Release   • CBC & Differential     Preventative care- Follow heart healthy diet, drink water, walk daily. Wear seatbelts, wear helmets, wear sunscreens. Follow CDC guidelines for covid pandemic.     Anxiety and depression-reviewed PHQ-9 score and YINA-7 score with patient.  Her symptoms are not well controlled.  Increase sertraline to 100 mg daily, add buspirone 5 twice daily.  Notify provider via MyChart if symptoms have improved in 2 weeks.  We have the ability to increase both of these medications.  Also discussed possibility of GeneSight testing and referral to psych NP in office.  Continue with therapy.  Follow heart healthy diet and regular activity.  Limit stress and stimulants    Asthma-continue with inhalers, avoid triggers, call for coughing wheezing or worsening shortness of breath    Migraine headaches-we will discuss with Niesha Stephens NP for neuro.  Patient does not want to take anything that would potentially affect future pregnancy.  She does not want rescue medications but  would like something she can take every day.    Additional time  acute concerns 23 minutes     Education provided in AVS   Return in about 3 months (around 12/28/2022) for Recheck.

## 2022-09-29 LAB
ALBUMIN SERPL-MCNC: 4.6 G/DL (ref 3.8–4.8)
ALBUMIN/GLOB SERPL: 2.2 {RATIO} (ref 1.2–2.2)
ALP SERPL-CCNC: 51 IU/L (ref 44–121)
ALT SERPL-CCNC: 9 IU/L (ref 0–32)
AST SERPL-CCNC: 17 IU/L (ref 0–40)
BASOPHILS # BLD AUTO: 0 X10E3/UL (ref 0–0.2)
BASOPHILS NFR BLD AUTO: 1 %
BILIRUB SERPL-MCNC: 0.2 MG/DL (ref 0–1.2)
BUN SERPL-MCNC: 14 MG/DL (ref 6–20)
BUN/CREAT SERPL: 16 (ref 9–23)
CALCIUM SERPL-MCNC: 9.5 MG/DL (ref 8.7–10.2)
CHLORIDE SERPL-SCNC: 102 MMOL/L (ref 96–106)
CHOLEST SERPL-MCNC: 154 MG/DL (ref 100–199)
CHOLEST/HDLC SERPL: 3.3 RATIO (ref 0–4.4)
CO2 SERPL-SCNC: 23 MMOL/L (ref 20–29)
CREAT SERPL-MCNC: 0.87 MG/DL (ref 0.57–1)
EGFRCR SERPLBLD CKD-EPI 2021: 90 ML/MIN/1.73
EOSINOPHIL # BLD AUTO: 0.1 X10E3/UL (ref 0–0.4)
EOSINOPHIL NFR BLD AUTO: 2 %
ERYTHROCYTE [DISTWIDTH] IN BLOOD BY AUTOMATED COUNT: 11.6 % (ref 11.7–15.4)
GLOBULIN SER CALC-MCNC: 2.1 G/DL (ref 1.5–4.5)
GLUCOSE SERPL-MCNC: 90 MG/DL (ref 70–99)
HCT VFR BLD AUTO: 37.1 % (ref 34–46.6)
HDLC SERPL-MCNC: 47 MG/DL
HGB BLD-MCNC: 12.8 G/DL (ref 11.1–15.9)
IMM GRANULOCYTES # BLD AUTO: 0 X10E3/UL (ref 0–0.1)
IMM GRANULOCYTES NFR BLD AUTO: 0 %
LDLC SERPL CALC-MCNC: 88 MG/DL (ref 0–99)
LYMPHOCYTES # BLD AUTO: 2.1 X10E3/UL (ref 0.7–3.1)
LYMPHOCYTES NFR BLD AUTO: 32 %
MCH RBC QN AUTO: 31.7 PG (ref 26.6–33)
MCHC RBC AUTO-ENTMCNC: 34.5 G/DL (ref 31.5–35.7)
MCV RBC AUTO: 92 FL (ref 79–97)
MONOCYTES # BLD AUTO: 0.5 X10E3/UL (ref 0.1–0.9)
MONOCYTES NFR BLD AUTO: 7 %
NEUTROPHILS # BLD AUTO: 3.8 X10E3/UL (ref 1.4–7)
NEUTROPHILS NFR BLD AUTO: 58 %
PLATELET # BLD AUTO: 361 X10E3/UL (ref 150–450)
POTASSIUM SERPL-SCNC: 4.5 MMOL/L (ref 3.5–5.2)
PROT SERPL-MCNC: 6.7 G/DL (ref 6–8.5)
RBC # BLD AUTO: 4.04 X10E6/UL (ref 3.77–5.28)
SODIUM SERPL-SCNC: 139 MMOL/L (ref 134–144)
TRIGL SERPL-MCNC: 105 MG/DL (ref 0–149)
TSH SERPL DL<=0.005 MIU/L-ACNC: 0.86 UIU/ML (ref 0.45–4.5)
VLDLC SERPL CALC-MCNC: 19 MG/DL (ref 5–40)
WBC # BLD AUTO: 6.5 X10E3/UL (ref 3.4–10.8)

## 2022-11-28 RX ORDER — BUSPIRONE HYDROCHLORIDE 5 MG/1
5 TABLET ORAL 2 TIMES DAILY
Qty: 60 TABLET | Refills: 2 | Status: SHIPPED | OUTPATIENT
Start: 2022-11-28 | End: 2023-01-04 | Stop reason: SDUPTHER

## 2022-11-29 RX ORDER — CLINDAMYCIN PHOSPHATE 20 MG/G
1 CREAM VAGINAL NIGHTLY
Qty: 40 G | Refills: 0 | Status: SHIPPED | OUTPATIENT
Start: 2022-11-29 | End: 2022-12-06

## 2022-12-19 ENCOUNTER — OFFICE VISIT (OUTPATIENT)
Dept: OBSTETRICS AND GYNECOLOGY | Facility: CLINIC | Age: 32
End: 2022-12-19

## 2022-12-19 VITALS
WEIGHT: 150 LBS | SYSTOLIC BLOOD PRESSURE: 110 MMHG | HEIGHT: 67 IN | DIASTOLIC BLOOD PRESSURE: 60 MMHG | BODY MASS INDEX: 23.54 KG/M2

## 2022-12-19 DIAGNOSIS — Z11.3 SCREEN FOR STD (SEXUALLY TRANSMITTED DISEASE): Primary | ICD-10-CM

## 2022-12-19 DIAGNOSIS — N94.9 VAGINAL DISCOMFORT: ICD-10-CM

## 2022-12-19 DIAGNOSIS — Z13.89 SCREENING FOR GENITOURINARY CONDITION: ICD-10-CM

## 2022-12-19 LAB
B-HCG UR QL: NEGATIVE
BILIRUB BLD-MCNC: NEGATIVE MG/DL
CLARITY, POC: CLEAR
COLOR UR: YELLOW
EXPIRATION DATE: NORMAL
GLUCOSE UR STRIP-MCNC: NEGATIVE MG/DL
INTERNAL NEGATIVE CONTROL: NORMAL
INTERNAL POSITIVE CONTROL: NORMAL
KETONES UR QL: NEGATIVE
LEUKOCYTE EST, POC: NEGATIVE
Lab: NORMAL
NITRITE UR-MCNC: NEGATIVE MG/ML
PH UR: 5 [PH] (ref 5–8)
PROT UR STRIP-MCNC: NEGATIVE MG/DL
RBC # UR STRIP: NEGATIVE /UL
SP GR UR: 1 (ref 1–1.03)
UROBILINOGEN UR QL: NORMAL

## 2022-12-19 PROCEDURE — 81025 URINE PREGNANCY TEST: CPT | Performed by: OBSTETRICS & GYNECOLOGY

## 2022-12-19 PROCEDURE — 81002 URINALYSIS NONAUTO W/O SCOPE: CPT | Performed by: OBSTETRICS & GYNECOLOGY

## 2022-12-19 PROCEDURE — 99213 OFFICE O/P EST LOW 20 MIN: CPT | Performed by: OBSTETRICS & GYNECOLOGY

## 2022-12-21 RX ORDER — SERTRALINE HYDROCHLORIDE 100 MG/1
100 TABLET, FILM COATED ORAL DAILY
Qty: 30 TABLET | Refills: 3 | Status: SHIPPED | OUTPATIENT
Start: 2022-12-21 | End: 2023-01-04 | Stop reason: SDUPTHER

## 2022-12-27 RX ORDER — DOXYCYCLINE HYCLATE 100 MG
100 TABLET ORAL 2 TIMES DAILY
Qty: 14 TABLET | Refills: 0 | Status: SHIPPED | OUTPATIENT
Start: 2022-12-27 | End: 2023-01-04

## 2022-12-28 ENCOUNTER — TELEPHONE (OUTPATIENT)
Dept: OBSTETRICS AND GYNECOLOGY | Facility: CLINIC | Age: 32
End: 2022-12-28

## 2022-12-28 LAB
HBV SURFACE AG SERPL QL IA: NEGATIVE
HCV AB S/CO SERPL IA: <0.1 S/CO RATIO (ref 0–0.9)
HIV 1+2 AB+HIV1 P24 AG SERPL QL IA: NON REACTIVE
HSV1 IGG SER IA-ACNC: 34.5 INDEX (ref 0–0.9)
HSV2 IGG SER IA-ACNC: <0.91 INDEX (ref 0–0.9)
RPR SER QL: NON REACTIVE
RPR SER QL: NON REACTIVE

## 2022-12-28 NOTE — TELEPHONE ENCOUNTER
Caller: Daija Dean    Relationship: Self    Best call back number: [phone]    What is the best time to reach you: ANY AND MAY LEAVE V/M    What was the call regarding: RETURNING JOB'S PHONE CALL REGARDING LABS. UNABLE TO WT.    Do you require a callback: YES

## 2023-01-04 ENCOUNTER — OFFICE VISIT (OUTPATIENT)
Dept: FAMILY MEDICINE CLINIC | Facility: CLINIC | Age: 33
End: 2023-01-04
Payer: COMMERCIAL

## 2023-01-04 VITALS
OXYGEN SATURATION: 100 % | HEART RATE: 67 BPM | SYSTOLIC BLOOD PRESSURE: 122 MMHG | WEIGHT: 149 LBS | BODY MASS INDEX: 23.39 KG/M2 | DIASTOLIC BLOOD PRESSURE: 82 MMHG | HEIGHT: 67 IN

## 2023-01-04 DIAGNOSIS — G43.709 CHRONIC MIGRAINE WITHOUT AURA WITHOUT STATUS MIGRAINOSUS, NOT INTRACTABLE: ICD-10-CM

## 2023-01-04 DIAGNOSIS — F41.9 ANXIETY AND DEPRESSION: Primary | ICD-10-CM

## 2023-01-04 DIAGNOSIS — F32.A ANXIETY AND DEPRESSION: Primary | ICD-10-CM

## 2023-01-04 PROCEDURE — 99214 OFFICE O/P EST MOD 30 MIN: CPT | Performed by: NURSE PRACTITIONER

## 2023-01-04 RX ORDER — BUSPIRONE HYDROCHLORIDE 5 MG/1
5 TABLET ORAL 2 TIMES DAILY
Qty: 60 TABLET | Refills: 2 | Status: SHIPPED | OUTPATIENT
Start: 2023-01-04 | End: 2023-03-24 | Stop reason: SDUPTHER

## 2023-01-04 RX ORDER — RIZATRIPTAN BENZOATE 10 MG/1
10 TABLET ORAL ONCE AS NEEDED
Qty: 15 TABLET | Refills: 1 | Status: SHIPPED | OUTPATIENT
Start: 2023-01-04 | End: 2023-03-24 | Stop reason: SDUPTHER

## 2023-01-04 RX ORDER — TOPIRAMATE 50 MG/1
TABLET, FILM COATED ORAL
Qty: 90 TABLET | Refills: 2 | Status: SHIPPED | OUTPATIENT
Start: 2023-01-04 | End: 2023-03-24 | Stop reason: SDUPTHER

## 2023-01-04 RX ORDER — SERTRALINE HYDROCHLORIDE 100 MG/1
100 TABLET, FILM COATED ORAL DAILY
Qty: 30 TABLET | Refills: 3 | Status: SHIPPED | OUTPATIENT
Start: 2023-01-04 | End: 2023-03-24 | Stop reason: SDUPTHER

## 2023-01-04 NOTE — PROGRESS NOTES
Subjective   Daija Dean is a 32 y.o. female.     History of Present Illness   Established pt, 3 mo follow up for migraines and anxiety/depression    Chronic depression and anxiety. Started sertraline 100 and buspirone 5 bid in September.   Seeing therapist. Overall she is feeling better.  Denies SI/HI.  Wants to continue both medicines.    Chronic migraines x years. She was previously on topimax 50 qd and stopped this because she was planning on getting pregnant. She would like to restart this as she is having headaches weekly.  She is open to taking supplements to help with migraines.    The following portions of the patient's history were reviewed and updated as appropriate: allergies, current medications, past family history, past medical history, past social history, past surgical history and problem list.    Review of Systems    Objective   Physical Exam  Vitals reviewed.   Constitutional:       Appearance: Normal appearance.   HENT:      Mouth/Throat:      Mouth: Mucous membranes are moist.   Cardiovascular:      Rate and Rhythm: Normal rate and regular rhythm.      Pulses: Normal pulses.      Heart sounds: Normal heart sounds.   Pulmonary:      Effort: Pulmonary effort is normal.      Breath sounds: Normal breath sounds.   Abdominal:      General: Abdomen is flat.      Palpations: Abdomen is soft.   Skin:     General: Skin is warm and dry.   Neurological:      General: No focal deficit present.      Mental Status: She is alert.   Psychiatric:         Mood and Affect: Mood normal.         Vitals:    01/04/23 1522   BP: 122/82   Pulse: 67   SpO2: 100%     Body mass index is 23.34 kg/m².    Procedures    Assessment & Plan   Problems Addressed this Visit        Mental Health    Anxiety and depression - Primary    Relevant Medications    busPIRone (BUSPAR) 5 MG tablet    sertraline (ZOLOFT) 100 MG tablet       Neuro    Headache, migraine    Relevant Medications    topiramate (TOPAMAX) 50 MG tablet     rizatriptan (Maxalt) 10 MG tablet    sertraline (ZOLOFT) 100 MG tablet   Diagnoses       Codes Comments    Anxiety and depression    -  Primary ICD-10-CM: F41.9, F32.A  ICD-9-CM: 300.00, 311     Chronic migraine without aura without status migrainosus, not intractable     ICD-10-CM: G43.709  ICD-9-CM: 346.70         Anxiety and depression-continue with sertraline and buspirone, refill sent, 988 or ER for significant worsening.  Encourage heart healthy diet, walking, use social supports  Practice    Chronic migraine-restart Topamax 50 daily, Rx rizatriptan 10 mg as rescue, take 1 with start of headache, may repeat x1 in 2 hours.  Hydrate, avoid triggers, advised patient to start magnesium 500 mg daily and coQ10 twice daily.  Reviewed medication side effect profile for new meds and refilled meds.  She declines referral to neuro at this time.  Reviewed last neuro note from 1/6/2020 Niesha Stephens NP         Education provided in AVS   Return if symptoms worsen or fail to improve.

## 2023-03-24 RX ORDER — BUSPIRONE HYDROCHLORIDE 5 MG/1
5 TABLET ORAL 2 TIMES DAILY
Qty: 180 TABLET | Refills: 1 | Status: SHIPPED | OUTPATIENT
Start: 2023-03-24 | End: 2023-04-23

## 2023-03-24 RX ORDER — TOPIRAMATE 50 MG/1
TABLET, FILM COATED ORAL
Qty: 90 TABLET | Refills: 2 | Status: SHIPPED | OUTPATIENT
Start: 2023-03-24

## 2023-03-24 RX ORDER — SERTRALINE HYDROCHLORIDE 100 MG/1
100 TABLET, FILM COATED ORAL DAILY
Qty: 90 TABLET | Refills: 2 | Status: SHIPPED | OUTPATIENT
Start: 2023-03-24 | End: 2023-06-22

## 2023-03-24 RX ORDER — RIZATRIPTAN BENZOATE 10 MG/1
10 TABLET ORAL ONCE AS NEEDED
Qty: 15 TABLET | Refills: 1 | Status: SHIPPED | OUTPATIENT
Start: 2023-03-24

## 2023-03-24 RX ORDER — CETIRIZINE HYDROCHLORIDE 10 MG/1
10 TABLET ORAL DAILY
Qty: 90 TABLET | Refills: 2 | Status: SHIPPED | OUTPATIENT
Start: 2023-03-24 | End: 2023-06-22

## 2023-06-05 ENCOUNTER — OFFICE VISIT (OUTPATIENT)
Dept: OBSTETRICS AND GYNECOLOGY | Facility: CLINIC | Age: 33
End: 2023-06-05
Payer: COMMERCIAL

## 2023-06-05 VITALS
SYSTOLIC BLOOD PRESSURE: 112 MMHG | WEIGHT: 148.2 LBS | DIASTOLIC BLOOD PRESSURE: 68 MMHG | BODY MASS INDEX: 23.26 KG/M2 | HEIGHT: 67 IN

## 2023-06-05 DIAGNOSIS — Z11.3 SCREENING FOR STD (SEXUALLY TRANSMITTED DISEASE): Primary | ICD-10-CM

## 2023-06-05 DIAGNOSIS — Z13.89 SCREENING FOR BLOOD OR PROTEIN IN URINE: ICD-10-CM

## 2023-06-05 RX ORDER — ONDANSETRON 4 MG/1
TABLET, ORALLY DISINTEGRATING ORAL
COMMUNITY
Start: 2023-02-13

## 2023-06-05 RX ORDER — VALACYCLOVIR HYDROCHLORIDE 1 G/1
TABLET, FILM COATED ORAL
COMMUNITY
Start: 2023-05-16

## 2023-06-05 RX ORDER — CLINDAMYCIN HYDROCHLORIDE 300 MG/1
1 CAPSULE ORAL EVERY 12 HOURS SCHEDULED
COMMUNITY
Start: 2023-05-20

## 2023-06-05 NOTE — PROGRESS NOTES
"Subjective     Chief Complaint   Patient presents with    Exposure to STD       Daija Dean is a 32 y.o.  whose LMP is No LMP recorded. Patient has had an implant.. This patient is new to me - yes.  She presents with STI screening    HPI    Having BV symptoms ~ 3 weeks ago; always gets symptoms when she has a new sex partner.  Used a condom.  Partner is a resident; gave her some clindamycin with improvement.  Denies vaginal discharge or irritation.  Having left lower abd pain x 1 month.  Desires STI screening.  Hx of ovarian cyst on left side.  Denies fever but is having night sweats.  Uses Mirena for contraception.    The following portions of the patient's history were reviewed and updated as appropriate:vital signs, allergies, current medications, past medical history, past social history, past surgical history, and problem list      Review of Systems     Review of Systems   Constitutional:  Negative for chills and fever.        Night sweats   Gastrointestinal:  Positive for abdominal pain. Negative for constipation.   Genitourinary:  Negative for dysuria, genital sores, menstrual problem, pelvic pain, vaginal bleeding and vaginal pain.     Objective      /68   Ht 170.2 cm (67\")   Wt 67.2 kg (148 lb 3.2 oz)   BMI 23.21 kg/m²     Physical Exam    Physical Exam  Vitals reviewed.   Constitutional:       General: She is awake. She is not in acute distress.     Appearance: She is not ill-appearing.   Eyes:      Conjunctiva/sclera: Conjunctivae normal.   Pulmonary:      Effort: Pulmonary effort is normal. No respiratory distress.   Genitourinary:     General: Normal vulva.      Exam position: Supine.      Labia:         Right: No rash.         Left: No rash.       Vagina: Normal.      Cervix: Normal.      Uterus: Normal.       Adnexa: Right adnexa normal.      Comments: Unable to visualize IUD strings - pt aware  Musculoskeletal:      Cervical back: Neck supple. No rigidity.   Lymphadenopathy:      " Lower Body: No right inguinal adenopathy. No left inguinal adenopathy.   Skin:     General: Skin is warm and dry.      Capillary Refill: Capillary refill takes less than 2 seconds.   Neurological:      Mental Status: She is alert and oriented to person, place, and time.   Psychiatric:         Mood and Affect: Mood and affect normal.         Behavior: Behavior normal.       Lab Review   Labs: No data reviewed     Imaging   No data reviewed    Assessment  Diagnoses and all orders for this visit:    1. Screening for STD (sexually transmitted disease) (Primary)  -     Hepatitis B Surface Antigen  -     Hepatitis C Antibody  -     HIV-1 / O / 2 Ag / Antibody 4th Generation  -     HSV 1 & 2 - Specific Antibody, IgG  -     RPR, Rfx Qn RPR / Confirm TP  -     Cancel: Chlamydia trachomatis, Neisseria gonorrhoeae, Trichomonas vaginalis, PCR - Urine, Urine, Random Void  -     NuSwab VG+ - Swab, Vagina  -     Genital Mycoplasmas KARMA, Swab - Swab, Vagina          Return if symptoms worsen or fail to improve.    I spent 15 minutes caring for Daija on this date of service. This time includes time spent by me in the following activities: preparing for the visit, obtaining and/or reviewing a separately obtained history, performing a medically appropriate examination and/or evaluation, counseling and educating the patient/family/caregiver, ordering medications, tests, or procedures, and documenting information in the medical record     Ying Orr, APRROE  6/5/2023  10:21 EDT

## 2023-06-06 LAB
HBV SURFACE AG SERPL QL IA: NEGATIVE
HCV IGG SERPL QL IA: NON REACTIVE
HIV 1+2 AB+HIV1 P24 AG SERPL QL IA: NON REACTIVE
HSV1 IGG SER IA-ACNC: 37.2 INDEX (ref 0–0.9)
HSV2 IGG SER IA-ACNC: <0.91 INDEX (ref 0–0.9)
RPR SER QL: NON REACTIVE

## 2023-06-11 RX ORDER — AZITHROMYCIN 250 MG/1
TABLET, FILM COATED ORAL
Qty: 6 TABLET | Refills: 0 | Status: SHIPPED | OUTPATIENT
Start: 2023-06-11

## 2023-09-18 RX ORDER — BUSPIRONE HYDROCHLORIDE 5 MG/1
5 TABLET ORAL 2 TIMES DAILY
Qty: 180 TABLET | Refills: 1 | Status: SHIPPED | OUTPATIENT
Start: 2023-09-18 | End: 2023-12-17

## 2023-09-18 RX ORDER — TOPIRAMATE 50 MG/1
TABLET, FILM COATED ORAL
Qty: 90 TABLET | Refills: 1 | Status: SHIPPED | OUTPATIENT
Start: 2023-09-18

## 2023-09-18 RX ORDER — SERTRALINE HYDROCHLORIDE 100 MG/1
100 TABLET, FILM COATED ORAL DAILY
Qty: 90 TABLET | Refills: 1 | Status: SHIPPED | OUTPATIENT
Start: 2023-09-18 | End: 2023-12-17

## 2023-10-17 RX ORDER — CETIRIZINE HYDROCHLORIDE 10 MG/1
10 TABLET ORAL DAILY
Qty: 90 TABLET | Refills: 7 | Status: SHIPPED | OUTPATIENT
Start: 2023-10-17

## 2023-10-30 RX ORDER — TOPIRAMATE 50 MG/1
TABLET, FILM COATED ORAL
Qty: 30 TABLET | Refills: 0 | Status: SHIPPED | OUTPATIENT
Start: 2023-10-30

## 2023-10-30 RX ORDER — BUSPIRONE HYDROCHLORIDE 5 MG/1
5 TABLET ORAL 2 TIMES DAILY
Qty: 180 TABLET | Refills: 0 | Status: SHIPPED | OUTPATIENT
Start: 2023-10-30 | End: 2024-01-28

## 2023-10-30 NOTE — TELEPHONE ENCOUNTER
Last visit 1/4/23  No f/u scheduled    30 day supply, 0 refills. Note to pharmacy pt needs appointment for further refills

## 2023-11-27 RX ORDER — TOPIRAMATE 50 MG/1
50 TABLET, FILM COATED ORAL DAILY
Qty: 30 TABLET | Refills: 0 | Status: SHIPPED | OUTPATIENT
Start: 2023-11-27

## 2023-12-06 RX ORDER — BUSPIRONE HYDROCHLORIDE 5 MG/1
TABLET ORAL
Qty: 60 TABLET | Refills: 0 | Status: SHIPPED | OUTPATIENT
Start: 2023-12-06 | End: 2023-12-07 | Stop reason: SDUPTHER

## 2023-12-07 RX ORDER — BUSPIRONE HYDROCHLORIDE 5 MG/1
5 TABLET ORAL 2 TIMES DAILY
Qty: 60 TABLET | Refills: 0 | Status: SHIPPED | OUTPATIENT
Start: 2023-12-07

## 2023-12-11 ENCOUNTER — TELEPHONE (OUTPATIENT)
Dept: FAMILY MEDICINE CLINIC | Facility: CLINIC | Age: 33
End: 2023-12-11
Payer: COMMERCIAL

## 2023-12-11 NOTE — TELEPHONE ENCOUNTER
Pharmacy contacted office to clarify directions for buspirone prescription. Spoke to pharmacy tech to state directions are take 1 tablet PO BID. Pharmacy tech verbalized an understanding.

## 2024-02-23 ENCOUNTER — OFFICE VISIT (OUTPATIENT)
Dept: FAMILY MEDICINE CLINIC | Facility: CLINIC | Age: 34
End: 2024-02-23
Payer: COMMERCIAL

## 2024-02-23 ENCOUNTER — TELEPHONE (OUTPATIENT)
Dept: FAMILY MEDICINE CLINIC | Facility: CLINIC | Age: 34
End: 2024-02-23

## 2024-02-23 VITALS
SYSTOLIC BLOOD PRESSURE: 128 MMHG | WEIGHT: 138 LBS | HEART RATE: 67 BPM | HEIGHT: 67 IN | OXYGEN SATURATION: 100 % | DIASTOLIC BLOOD PRESSURE: 82 MMHG | RESPIRATION RATE: 20 BRPM | BODY MASS INDEX: 21.66 KG/M2

## 2024-02-23 DIAGNOSIS — R03.0 BLOOD PRESSURE ELEVATED WITHOUT HISTORY OF HTN: Primary | ICD-10-CM

## 2024-02-23 DIAGNOSIS — R05.8 COUGH PRODUCTIVE OF PURULENT SPUTUM: ICD-10-CM

## 2024-02-23 PROBLEM — G43.909 HEADACHE, MIGRAINE: Status: RESOLVED | Noted: 2019-03-20 | Resolved: 2024-02-23

## 2024-02-23 PROBLEM — B37.31 VAGINAL CANDIDIASIS: Status: RESOLVED | Noted: 2018-01-29 | Resolved: 2024-02-23

## 2024-02-23 LAB
BILIRUB BLD-MCNC: NEGATIVE MG/DL
CLARITY, POC: CLEAR
COLOR UR: YELLOW
EXPIRATION DATE: 0.3
GLUCOSE UR STRIP-MCNC: NEGATIVE MG/DL
KETONES UR QL: NEGATIVE
LEUKOCYTE EST, POC: NEGATIVE
Lab: ABNORMAL
NITRITE UR-MCNC: NEGATIVE MG/ML
PH UR: 6 [PH] (ref 5–8)
PROT UR STRIP-MCNC: ABNORMAL MG/DL
RBC # UR STRIP: ABNORMAL /UL
SP GR UR: 1.02 (ref 1–1.03)
UROBILINOGEN UR QL: NORMAL

## 2024-02-23 PROCEDURE — 81003 URINALYSIS AUTO W/O SCOPE: CPT | Performed by: FAMILY MEDICINE

## 2024-02-23 PROCEDURE — 99213 OFFICE O/P EST LOW 20 MIN: CPT | Performed by: FAMILY MEDICINE

## 2024-02-23 RX ORDER — LISINOPRIL 10 MG/1
10 TABLET ORAL DAILY
Qty: 30 TABLET | Refills: 3 | Status: SHIPPED | OUTPATIENT
Start: 2024-02-23

## 2024-02-23 RX ORDER — AZITHROMYCIN 250 MG/1
TABLET, FILM COATED ORAL
Qty: 6 TABLET | Refills: 0 | Status: SHIPPED | OUTPATIENT
Start: 2024-02-23 | End: 2024-02-28

## 2024-02-23 NOTE — TELEPHONE ENCOUNTER
Message routed to patient's PCP on accident. PCP sent in medication for patient to start for high blood pressure. Patient saw Dr. Kebede today for an office visit, in which medication was not recommended at this time. PCP instructed me to contact patient to disregard lisinopril sent in to pharmacy and to follow-up with PCP as Dr. Kebede recommended. Left detailed message to patient explaining to disregard medication sent by Yissel Mason (lisinopril 10mg tablet) and to follow instructions from Dr. Kebede from office visit today.

## 2024-02-23 NOTE — TELEPHONE ENCOUNTER
"Patient reports her BP readings have been consistently high for 1.5 weeks, 160s systolic, 100s diastolic. Patient has been getting her BP readings through at-home wrist cuff. Patient states she started taking her BP readings when she felt \"off\" and dizzy and noticed the numbers have been higher than normal. Patient confirms family history of hypertension. Patient notes no recent change in life stressors or illness, but did start taking Adderall recently. Patient has stopped Adderall as of 4 days ago and still reports high BP readings.     Patient is also fasting in anticipation of blood work.  "

## 2024-02-23 NOTE — TELEPHONE ENCOUNTER
LVM for patient to return call, per the request of Dr. Kebede. Patient scheduled for appointment with Dr. Kebede today who would like more information on blood pressure readings prior to visit. Patient canceled visit to request sooner appointment, scheduled 15 minutes earlier.

## 2024-02-23 NOTE — PROGRESS NOTES
Assessment & Plan  1. Elevated blood pressure without hypertension.  Blood pressure is higher today than it was here in 06/2023. The patient is using a wrist cuff and I have asked her to obtain a good arm cuff, recommended brand of Omron. We will have her continue to monitor.    2. Cough with purulent sputum production.  I will go ahead and treat with an antibiotic since it has been going on for 14 days.    Follow-up  Follow-up will be with Yissel Mason or I would recommend finding a primary care doctor down in [place].      Patient was given instructions and counseling regarding her condition or for health maintenance advice. Please see specific information pulled into the AVS if appropriate.          Daija is a 33 y.o. being seen today for  Hypertension   HISTORY    HPI  History of Present Illness  Patient is accompanied by her father.    She started a new job in 10/2023 as an , a job she really likes. She established care with a telehealth doctor. She was prescribed Adderall and wonders if that could be causing her elevated blood pressure.She stopped that four days ago.  She checks her blood pressure every month since beginning the Adderal in November. She has a cuff in her purse at all times. In the past 2 weeks or so, she has felt dizzy, lightheaded, a little nauseous, and not her normal self at work. She got out of the wrist cuff and her blood pressure was pretty high. She made sure she took it at different times of the day. She stopped taking her Adderall as of Monday of this past week, and it still seemed elevated. She has heard that the wrist cuffs are not great. She has been on Adderall since 11/01/2023. She thinks she had high blood pressure before and was on nadolol but she also was treated for migraines. This has not been in the last eight years. She has been getting some headaches in the back of her head in the past month or so. These symptoms began before she took her BP.    She has had a  cough with yellow phlegm for over 2 weeks. She has been taking Mucinex DM. She coughs all the time and it is hard to sleep. She did have a history of asthma.   She has a family history of high blood pressure.   She has migraines. Her migraines have gotten a lot better.  Social History  She  reports that she has never smoked. She has never used smokeless tobacco. She reports current alcohol use of about 8.0 standard drinks of alcohol per week. She reports that she does not use drugs.  EXAM DATA    Vital Signs        BP Readings from Last 1 Encounters:   02/23/24 128/82     Wt Readings from Last 3 Encounters:   02/23/24 62.6 kg (138 lb)   06/05/23 67.2 kg (148 lb 3.2 oz)   01/04/23 67.6 kg (149 lb)   Body mass index is 21.61 kg/m².  Physical Exam  Patient does not appear ill. Generally healthy in appearance and a normal weight.  Lungs: Clear to auscultation with some increased expiratory time.  CV: Regular rate and rhythm.    Vital Signs  Blood pressure is 130/76 on recheck.       Patient or patient representative verbalized consent for the use of Ambient Listening during the visit with  Yvette Kebede MD for chart documentation. 2/23/2024  11:10 EST

## 2024-02-24 LAB
BUN SERPL-MCNC: 13 MG/DL (ref 6–20)
BUN/CREAT SERPL: 14.8 (ref 7–25)
CALCIUM SERPL-MCNC: 9 MG/DL (ref 8.6–10.5)
CHLORIDE SERPL-SCNC: 104 MMOL/L (ref 98–107)
CO2 SERPL-SCNC: 23.6 MMOL/L (ref 22–29)
CREAT SERPL-MCNC: 0.88 MG/DL (ref 0.57–1)
EGFRCR SERPLBLD CKD-EPI 2021: 88 ML/MIN/1.73
GLUCOSE SERPL-MCNC: 91 MG/DL (ref 65–99)
POTASSIUM SERPL-SCNC: 3.8 MMOL/L (ref 3.5–5.2)
SODIUM SERPL-SCNC: 140 MMOL/L (ref 136–145)

## 2024-02-27 DIAGNOSIS — R05.8 COUGH PRODUCTIVE OF PURULENT SPUTUM: ICD-10-CM

## 2024-02-27 RX ORDER — AZITHROMYCIN 250 MG/1
TABLET, FILM COATED ORAL
Qty: 6 TABLET | Refills: 0 | OUTPATIENT
Start: 2024-02-27 | End: 2024-03-03

## 2024-03-04 RX ORDER — VALACYCLOVIR HYDROCHLORIDE 1 G/1
TABLET, FILM COATED ORAL
Qty: 90 TABLET | Refills: 3 | Status: SHIPPED | OUTPATIENT
Start: 2024-03-04

## (undated) DEVICE — ENDOPATH XCEL UNIVERSAL TROCAR STABLILITY SLEEVES: Brand: ENDOPATH XCEL

## (undated) DEVICE — PK LAP GYN 90

## (undated) DEVICE — 2, DISPOSABLE SUCTION/IRRIGATOR WITH DISPOSABLE TIP: Brand: STRYKEFLOW

## (undated) DEVICE — APPL CHLORAPREP W/TINT 26ML ORNG

## (undated) DEVICE — UNDYED BRAIDED (POLYGLACTIN 910), SYNTHETIC ABSORBABLE SUTURE: Brand: COATED VICRYL

## (undated) DEVICE — BNDG ADHS PLSTC 1X3IN LF

## (undated) DEVICE — SUCTION CANISTER, 1000CC,SAFELINER: Brand: DEROYAL

## (undated) DEVICE — HARMONIC ACE +7 LAPAROSCOPIC SHEARS ADVANCED HEMOSTASIS 5MM DIAMETER 36CM SHAFT LENGTH  FOR USE WITH GRAY HAND PIECE ONLY: Brand: HARMONIC ACE

## (undated) DEVICE — ENDOPATH XCEL BLADELESS TROCARS WITH STABILITY SLEEVES: Brand: ENDOPATH XCEL

## (undated) DEVICE — TBG INSUFL W FLTR STRL

## (undated) DEVICE — PAD SANI MAXI W/ADHS SNG WRP 11IN

## (undated) DEVICE — 3M™ STERI-STRIP™ REINFORCED ADHESIVE SKIN CLOSURES, R1547, 1/2 IN X 4 IN (12 MM X 100 MM), 6 STRIPS/ENVELOPE: Brand: 3M™ STERI-STRIP™

## (undated) DEVICE — GLV SURG SENSICARE MICRO PF LF 6 STRL